# Patient Record
Sex: FEMALE | Employment: PART TIME | URBAN - METROPOLITAN AREA
[De-identification: names, ages, dates, MRNs, and addresses within clinical notes are randomized per-mention and may not be internally consistent; named-entity substitution may affect disease eponyms.]

---

## 2018-11-02 ENCOUNTER — HOSPITAL ENCOUNTER (EMERGENCY)
Age: 23
Discharge: HOME | End: 2018-11-02
Attending: EMERGENCY MEDICINE
Payer: COMMERCIAL

## 2018-11-02 ENCOUNTER — APPOINTMENT (OUTPATIENT)
Dept: CT IMAGING | Age: 23
End: 2018-11-02
Payer: COMMERCIAL

## 2018-11-02 ENCOUNTER — APPOINTMENT (OUTPATIENT)
Dept: GENERAL RADIOLOGY | Age: 23
End: 2018-11-02
Payer: COMMERCIAL

## 2018-11-02 VITALS
HEART RATE: 95 BPM | BODY MASS INDEX: 22.82 KG/M2 | SYSTOLIC BLOOD PRESSURE: 123 MMHG | DIASTOLIC BLOOD PRESSURE: 83 MMHG | OXYGEN SATURATION: 100 % | WEIGHT: 124 LBS | HEIGHT: 62 IN | TEMPERATURE: 98.6 F | RESPIRATION RATE: 16 BRPM

## 2018-11-02 DIAGNOSIS — M50.30 DEGENERATIVE DISC DISEASE, CERVICAL: ICD-10-CM

## 2018-11-02 DIAGNOSIS — R20.2 PARESTHESIAS: Primary | ICD-10-CM

## 2018-11-02 LAB
ABSOLUTE EOS #: 0.1 K/UL (ref 0–0.4)
ABSOLUTE IMMATURE GRANULOCYTE: ABNORMAL K/UL (ref 0–0.3)
ABSOLUTE LYMPH #: 1.3 K/UL (ref 1–4.8)
ABSOLUTE MONO #: 0.4 K/UL (ref 0.2–0.8)
ACETAMINOPHEN LEVEL: <10 UG/ML (ref 10–30)
AMPHETAMINE SCREEN URINE: NEGATIVE
ANION GAP SERPL CALCULATED.3IONS-SCNC: 13 MMOL/L (ref 9–17)
BARBITURATE SCREEN URINE: NEGATIVE
BASOPHILS # BLD: 0 % (ref 0–2)
BASOPHILS ABSOLUTE: 0 K/UL (ref 0–0.2)
BENZODIAZEPINE SCREEN, URINE: NEGATIVE
BILIRUBIN URINE: NEGATIVE
BUN BLDV-MCNC: 10 MG/DL (ref 6–20)
BUN/CREAT BLD: 13 (ref 9–20)
BUPRENORPHINE URINE: NORMAL
CALCIUM SERPL-MCNC: 9.5 MG/DL (ref 8.6–10.4)
CANNABINOID SCREEN URINE: NEGATIVE
CHLORIDE BLD-SCNC: 105 MMOL/L (ref 98–107)
CHP ED QC CHECK: NORMAL
CHP ED QC CHECK: NORMAL
CO2: 24 MMOL/L (ref 20–31)
COCAINE METABOLITE, URINE: NEGATIVE
COLOR: YELLOW
COMMENT UA: NORMAL
CREAT SERPL-MCNC: 0.76 MG/DL (ref 0.5–0.9)
DIFFERENTIAL TYPE: ABNORMAL
EKG ATRIAL RATE: 95 BPM
EKG P AXIS: 72 DEGREES
EKG P-R INTERVAL: 146 MS
EKG Q-T INTERVAL: 348 MS
EKG QRS DURATION: 72 MS
EKG QTC CALCULATION (BAZETT): 437 MS
EKG R AXIS: 56 DEGREES
EKG T AXIS: 53 DEGREES
EKG VENTRICULAR RATE: 95 BPM
EOSINOPHILS RELATIVE PERCENT: 1 % (ref 1–4)
ETHANOL PERCENT: 0.01 %
ETHANOL: 10 MG/DL
GFR AFRICAN AMERICAN: >60 ML/MIN
GFR NON-AFRICAN AMERICAN: >60 ML/MIN
GFR SERPL CREATININE-BSD FRML MDRD: ABNORMAL ML/MIN/{1.73_M2}
GFR SERPL CREATININE-BSD FRML MDRD: ABNORMAL ML/MIN/{1.73_M2}
GLUCOSE BLD-MCNC: 100 MG/DL
GLUCOSE BLD-MCNC: 100 MG/DL (ref 65–105)
GLUCOSE BLD-MCNC: 100 MG/DL (ref 70–99)
GLUCOSE URINE: NEGATIVE
HCT VFR BLD CALC: 40.3 % (ref 36–46)
HEMOGLOBIN: 14.1 G/DL (ref 12–16)
IMMATURE GRANULOCYTES: ABNORMAL %
KETONES, URINE: NEGATIVE
LEUKOCYTE ESTERASE, URINE: NEGATIVE
LYMPHOCYTES # BLD: 24 % (ref 24–44)
MCH RBC QN AUTO: 31.2 PG (ref 26–34)
MCHC RBC AUTO-ENTMCNC: 35 G/DL (ref 31–37)
MCV RBC AUTO: 89.3 FL (ref 80–100)
MDMA URINE: NORMAL
METHADONE SCREEN, URINE: NEGATIVE
METHAMPHETAMINE, URINE: NORMAL
MONOCYTES # BLD: 7 % (ref 1–7)
NITRITE, URINE: NEGATIVE
NRBC AUTOMATED: ABNORMAL PER 100 WBC
OPIATES, URINE: NEGATIVE
OXYCODONE SCREEN URINE: NEGATIVE
PDW BLD-RTO: 12.3 % (ref 11.5–14.5)
PH UA: 7.5 (ref 5–8)
PHENCYCLIDINE, URINE: NEGATIVE
PLATELET # BLD: 238 K/UL (ref 130–400)
PLATELET ESTIMATE: ABNORMAL
PMV BLD AUTO: 7.8 FL (ref 6–12)
POTASSIUM SERPL-SCNC: 3.6 MMOL/L (ref 3.7–5.3)
PREGNANCY TEST URINE, POC: NORMAL
PROPOXYPHENE, URINE: NORMAL
PROTEIN UA: NEGATIVE
RBC # BLD: 4.51 M/UL (ref 4–5.2)
RBC # BLD: ABNORMAL 10*6/UL
SALICYLATE LEVEL: <1 MG/DL (ref 3–10)
SEG NEUTROPHILS: 68 % (ref 36–66)
SEGMENTED NEUTROPHILS ABSOLUTE COUNT: 3.6 K/UL (ref 1.8–7.7)
SODIUM BLD-SCNC: 142 MMOL/L (ref 135–144)
SPECIFIC GRAVITY UA: 1.01 (ref 1–1.03)
TEST INFORMATION: NORMAL
TOXIC TRICYCLIC SC,BLOOD: NEGATIVE
TRICYCLIC ANTIDEPRESSANTS, UR: NORMAL
TURBIDITY: CLEAR
URINE HGB: NEGATIVE
UROBILINOGEN, URINE: NORMAL
WBC # BLD: 5.4 K/UL (ref 3.5–11)
WBC # BLD: ABNORMAL 10*3/UL

## 2018-11-02 PROCEDURE — 96360 HYDRATION IV INFUSION INIT: CPT

## 2018-11-02 PROCEDURE — 85025 COMPLETE CBC W/AUTO DIFF WBC: CPT

## 2018-11-02 PROCEDURE — 2580000003 HC RX 258: Performed by: NURSE PRACTITIONER

## 2018-11-02 PROCEDURE — 70450 CT HEAD/BRAIN W/O DYE: CPT

## 2018-11-02 PROCEDURE — 80048 BASIC METABOLIC PNL TOTAL CA: CPT

## 2018-11-02 PROCEDURE — 72040 X-RAY EXAM NECK SPINE 2-3 VW: CPT

## 2018-11-02 PROCEDURE — 93005 ELECTROCARDIOGRAM TRACING: CPT

## 2018-11-02 PROCEDURE — 80307 DRUG TEST PRSMV CHEM ANLYZR: CPT

## 2018-11-02 PROCEDURE — 99285 EMERGENCY DEPT VISIT HI MDM: CPT

## 2018-11-02 PROCEDURE — G0480 DRUG TEST DEF 1-7 CLASSES: HCPCS

## 2018-11-02 PROCEDURE — 84703 CHORIONIC GONADOTROPIN ASSAY: CPT

## 2018-11-02 PROCEDURE — 81003 URINALYSIS AUTO W/O SCOPE: CPT

## 2018-11-02 PROCEDURE — 82947 ASSAY GLUCOSE BLOOD QUANT: CPT

## 2018-11-02 RX ORDER — BUPROPION HYDROCHLORIDE 300 MG/1
450 TABLET ORAL EVERY MORNING
COMMUNITY
End: 2021-06-24

## 2018-11-02 RX ORDER — 0.9 % SODIUM CHLORIDE 0.9 %
1000 INTRAVENOUS SOLUTION INTRAVENOUS ONCE
Status: COMPLETED | OUTPATIENT
Start: 2018-11-02 | End: 2018-11-02

## 2018-11-02 RX ADMIN — SODIUM CHLORIDE 1000 ML: 9 INJECTION, SOLUTION INTRAVENOUS at 16:47

## 2018-11-02 ASSESSMENT — ENCOUNTER SYMPTOMS
EYE PAIN: 0
TROUBLE SWALLOWING: 0
VOICE CHANGE: 0
ABDOMINAL PAIN: 0
SHORTNESS OF BREATH: 0
PHOTOPHOBIA: 0
VOMITING: 0
COLOR CHANGE: 0
COUGH: 0
SORE THROAT: 0
DIARRHEA: 0
NAUSEA: 0
BACK PAIN: 0

## 2018-11-02 NOTE — ED PROVIDER NOTES
subscore is 6. Skin: Skin is warm and dry. No rash noted. She is not diaphoretic. Psychiatric: She has a normal mood and affect. Her behavior is normal.   Vitals reviewed. DIAGNOSTIC RESULTS     RADIOLOGY:   Non-plain film images such as CT, Ultrasound and MRI are read by the radiologist. Plain radiographic images are visualized and preliminarily interpreted by the emergency physician with the below findings:    Interpretation per the Radiologist below, if available at the time of this note:    Xr Cervical Spine (2-3 Views)    Result Date: 11/2/2018  EXAMINATION: 3 XRAY VIEWS OF THE CERVICAL SPINE 11/2/2018 4:36 pm COMPARISON: None. HISTORY: ORDERING SYSTEM PROVIDED HISTORY: N/T right arm TECHNOLOGIST PROVIDED HISTORY: N/T right arm Ordering Physician Provided Reason for Exam: bilateral numbness in hands and tingle Acuity: Unknown Type of Exam: Unknown Additional signs and symptoms: n/a Relevant Medical/Surgical History: n/a FINDINGS: Mineralization and alignment are satisfactory. No acute fracture, subluxation, or prevertebral soft tissue swelling is noted. The odontoid is intact. Unremarkable cervical spine radiographic appearance. Ct Head Wo Contrast    Result Date: 11/2/2018  EXAMINATION: CT OF THE HEAD WITHOUT CONTRAST  11/2/2018 4:19 pm TECHNIQUE: CT of the head was performed without the administration of intravenous contrast. Dose modulation, iterative reconstruction, and/or weight based adjustment of the mA/kV was utilized to reduce the radiation dose to as low as reasonably achievable. COMPARISON: None. HISTORY: ORDERING SYSTEM PROVIDED HISTORY: N/T to face, right upper/lower extremity, difficulty speaking TECHNOLOGIST PROVIDED HISTORY: FINDINGS: BRAIN/VENTRICLES: There is no acute intracranial hemorrhage, mass effect or midline shift. No abnormal extra-axial fluid collection. The gray-white differentiation is maintained without evidence of an acute infarct.   There is no evidence of

## 2018-11-05 LAB — HCG, PREGNANCY URINE (POC): NEGATIVE

## 2021-06-24 ENCOUNTER — OFFICE VISIT (OUTPATIENT)
Dept: FAMILY MEDICINE CLINIC | Age: 26
End: 2021-06-24
Payer: COMMERCIAL

## 2021-06-24 VITALS
WEIGHT: 134 LBS | TEMPERATURE: 97.2 F | DIASTOLIC BLOOD PRESSURE: 81 MMHG | HEART RATE: 78 BPM | BODY MASS INDEX: 24.66 KG/M2 | OXYGEN SATURATION: 97 % | HEIGHT: 62 IN | SYSTOLIC BLOOD PRESSURE: 117 MMHG

## 2021-06-24 DIAGNOSIS — S23.9XXA THORACIC BACK SPRAIN, INITIAL ENCOUNTER: ICD-10-CM

## 2021-06-24 DIAGNOSIS — J30.2 SEASONAL ALLERGIES: ICD-10-CM

## 2021-06-24 DIAGNOSIS — Z11.59 NEED FOR HEPATITIS C SCREENING TEST: ICD-10-CM

## 2021-06-24 DIAGNOSIS — Z00.00 ANNUAL PHYSICAL EXAM: Primary | ICD-10-CM

## 2021-06-24 PROBLEM — M54.2 NECK PAIN: Status: ACTIVE | Noted: 2021-06-24

## 2021-06-24 PROCEDURE — 99385 PREV VISIT NEW AGE 18-39: CPT | Performed by: FAMILY MEDICINE

## 2021-06-24 RX ORDER — FLUTICASONE PROPIONATE 50 MCG
SPRAY, SUSPENSION (ML) NASAL
COMMUNITY
End: 2021-12-29 | Stop reason: ALTCHOICE

## 2021-06-24 RX ORDER — FOLIC ACID 1 MG/1
1 TABLET ORAL DAILY
COMMUNITY
End: 2021-12-29 | Stop reason: ALTCHOICE

## 2021-06-24 SDOH — ECONOMIC STABILITY: FOOD INSECURITY: WITHIN THE PAST 12 MONTHS, YOU WORRIED THAT YOUR FOOD WOULD RUN OUT BEFORE YOU GOT MONEY TO BUY MORE.: NEVER TRUE

## 2021-06-24 SDOH — ECONOMIC STABILITY: FOOD INSECURITY: WITHIN THE PAST 12 MONTHS, THE FOOD YOU BOUGHT JUST DIDN'T LAST AND YOU DIDN'T HAVE MONEY TO GET MORE.: NEVER TRUE

## 2021-06-24 ASSESSMENT — PATIENT HEALTH QUESTIONNAIRE - PHQ9
SUM OF ALL RESPONSES TO PHQ QUESTIONS 1-9: 0
SUM OF ALL RESPONSES TO PHQ QUESTIONS 1-9: 0
SUM OF ALL RESPONSES TO PHQ9 QUESTIONS 1 & 2: 0
2. FEELING DOWN, DEPRESSED OR HOPELESS: 0
SUM OF ALL RESPONSES TO PHQ QUESTIONS 1-9: 0
1. LITTLE INTEREST OR PLEASURE IN DOING THINGS: 0

## 2021-06-24 ASSESSMENT — ENCOUNTER SYMPTOMS
ABDOMINAL DISTENTION: 1
DIARRHEA: 1
RESPIRATORY NEGATIVE: 1
BACK PAIN: 1
EYES NEGATIVE: 1
CONSTIPATION: 1

## 2021-06-24 ASSESSMENT — SOCIAL DETERMINANTS OF HEALTH (SDOH): HOW HARD IS IT FOR YOU TO PAY FOR THE VERY BASICS LIKE FOOD, HOUSING, MEDICAL CARE, AND HEATING?: NOT HARD AT ALL

## 2021-06-24 NOTE — PATIENT INSTRUCTIONS
Patient Education        Back Stretches: Exercises  Introduction  Here are some examples of exercises for stretching your back. Start each exercise slowly. Ease off the exercise if you start to have pain. Your doctor or physical therapist will tell you when you can start these exercises and which ones will work best for you. How to do the exercises  Overhead stretch   1. Stand comfortably with your feet shoulder-width apart. 2. Looking straight ahead, raise both arms over your head and reach toward the ceiling. Do not allow your head to tilt back. 3. Hold for 15 to 30 seconds, then lower your arms to your sides. 4. Repeat 2 to 4 times. Side stretch   1. Stand comfortably with your feet shoulder-width apart. 2. Raise one arm over your head, and then lean to the other side. 3. Slide your hand down your leg as you let the weight of your arm gently stretch your side muscles. Hold for 15 to 30 seconds. 4. Repeat 2 to 4 times on each side. Press-up   1. Lie on your stomach, supporting your body with your forearms. 2. Press your elbows down into the floor to raise your upper back. As you do this, relax your stomach muscles and allow your back to arch without using your back muscles. As your press up, do not let your hips or pelvis come off the floor. 3. Hold for 15 to 30 seconds, then relax. 4. Repeat 2 to 4 times. Relax and rest   1. Lie on your back with a rolled towel under your neck and a pillow under your knees. Extend your arms comfortably to your sides. 2. Relax and breathe normally. 3. Remain in this position for about 10 minutes. 4. If you can, do this 2 or 3 times each day. Follow-up care is a key part of your treatment and safety. Be sure to make and go to all appointments, and call your doctor if you are having problems. It's also a good idea to know your test results and keep a list of the medicines you take. Where can you learn more? Go to https://alex.healthImmunetrics. org and sign in to your [employer] account. Enter B427 in the D[employer] SL box to learn more about \"Back Stretches: Exercises. \"     If you do not have an account, please click on the \"Sign Up Now\" link. Current as of: November 16, 2020               Content Version: 12.9  © 2006-2021 Healthwise, [employer]. Care instructions adapted under license by [facility] ([facility]). If you have questions about a medical condition or this instruction, always ask your healthcare professional. [address] any warranty or liability for your use of this information. Patient Education        Healthy Upper Back: Exercises  Introduction  Here are some examples of exercises for your upper back. Start each exercise slowly. Ease off the exercise if you start to have pain. Your doctor or physical therapist will tell you when you can start these exercises and which ones will work best for you. How to do the exercises  Lower neck and upper back stretch   1. Stretch your arms out in front of your body. Clasp one hand on top of your other hand. 2. Gently reach out so that you feel your shoulder blades stretching away from each other. 3. Gently bend your head forward. 4. Hold for 15 to 30 seconds. 5. Repeat 2 to 4 times. Midback stretch   If you have knee pain, do not do this exercise. 1. Kneel on the floor, and sit back on your ankles. 2. Lean forward, place your hands on the floor, and stretch your arms out in front of you. Rest your head between your arms. 3. Gently push your chest toward the floor, reaching as far in front of you as possible. 4. Hold for 15 to 30 seconds. 5. Repeat 2 to 4 times. Shoulder rolls   1. Sit comfortably with your feet shoulder-width apart. You can also do this exercise while standing. 2. Roll your shoulders up, then back, and then down in a smooth, circular motion. 3. Repeat 2 to 4 times. Wall push-up   1.  Stand against a wall with your feet about 12 to 24 inches back from the wall. If you feel any pain when you do this exercise, stand closer to the wall. 2. Place your hands on the wall slightly wider apart than your shoulders, and lean forward. 3. Gently lean your body toward the wall. Then push back to your starting position. Keep the motion smooth and controlled. 4. Repeat 8 to 12 times. Resisted shoulder blade squeeze   For this exercise, you will need elastic exercise material, such as surgical tubing or Thera-Band. 1. Sit or stand, holding the band in both hands in front of you. Keep your elbows close to your sides, bent at a 90-degree angle. Your palms should face up. 2. Squeeze your shoulder blades together, and move your arms to the outside, stretching the band. Be sure to keep your elbows at your sides while you do this. 3. Relax. 4. Repeat 8 to 12 times. Resisted rows   For this exercise, you will need elastic exercise material, such as surgical tubing or Thera-Band. 1. Put the band around a solid object, such as a bedpost, at about waist level. Hold one end of the band in each hand. 2. With your elbows at your sides and bent to 90 degrees, pull the band back to move your shoulder blades toward each other. Return to the starting position. 3. Repeat 8 to 12 times. Follow-up care is a key part of your treatment and safety. Be sure to make and go to all appointments, and call your doctor if you are having problems. It's also a good idea to know your test results and keep a list of the medicines you take. Where can you learn more? Go to https://LivemapowenProtean Payment.TribeHired. org and sign in to your [employer] account. Enter Y655 in the [employer] box to learn more about \"Healthy Upper Back: Exercises. \"     If you do not have an account, please click on the \"Sign Up Now\" link. Current as of: November 16, 2020               Content Version: 12.9  © 7091-6648 Healthwise, Incorporated.    Care instructions adapted under license by [facility] ([facility]). If you have questions about a medical condition or this instruction, always ask your healthcare professional. [place] [address] any warranty or liability for your use of this information.

## 2021-06-24 NOTE — PROGRESS NOTES
[address] PRIMARY CARE  [address] [place] [address] [address] [place] [address]  Dept: [phone]  Dept Fax: [phone]    Griselda Sole is a 22 y.o. female who is a New patient, who presents today for her medical conditions/complaints as noted below:  Chief Complaint   Patient presents with    Establish Care    Back Pain     pulled muscle         HPI:     The patient is a 22year old female with no significant past medical history who presents today as a new patient. The patient's past medical history, surgical history, family history, social history were all updated and reviewed in the chart. The patient complains of mid to low back pain. She did get a new mattress about 2 weeks ago but the pain in the back began about 2 days ago. The pain is worse with twisting motion or after rest.  It feels like a tension/pulling. She has tried nothing to help with the symptoms. Back Pain  This is a new problem. The current episode started in the past 7 days. The problem occurs daily. The problem is unchanged. The pain is present in the thoracic spine and lumbar spine. The quality of the pain is described as aching. The pain does not radiate. The pain is mild. The pain is the same all the time. The symptoms are aggravated by twisting. Pertinent negatives include no abdominal pain, bladder incontinence, bowel incontinence, chest pain, dysuria, fever, headaches, leg pain, numbness, paresis, paresthesias, pelvic pain, perianal numbness, tingling, weakness or weight loss. She has tried nothing for the symptoms. The treatment provided no relief. No results found for: LABA1C          ( goal A1Cis < 7)   No results found for: LABMICR  No results found for: LDLCHOLESTEROL, LDLCALC    (goal LDL is <100)   BUN (mg/dL)   Date Value   11/02/2018 10     BP Readings from Last 3 Encounters:   06/24/21 117/81   11/02/18 123/83          (goal 120/80)    History reviewed.  No pertinent past medical history. Past Surgical History:   Procedure Laterality Date    HYMENECTOMY  2014       Family History   Problem Relation Age of Onset    Colon Cancer Mother 36    Hypertension Father     Alzheimer's Disease Maternal Grandmother     Macular Degen Maternal Grandfather     Coronary Art Dis Maternal Grandfather     Kidney Disease Maternal Grandfather     Breast Cancer Paternal Grandmother     Alzheimer's Disease Paternal Grandfather     Colon Cancer Maternal Aunt     Stroke Paternal Aunt     Ovarian Cancer Neg Hx     Uterine Cancer Neg Hx        Social History     Tobacco Use    Smoking status: Never Smoker    Smokeless tobacco: Never Used   Substance Use Topics    Alcohol use: Yes     Comment: socially 2x /mth      Current Outpatient Medications   Medication Sig Dispense Refill    fluticasone (FLONASE) 50 MCG/ACT nasal spray fluticasone propionate 50 mcg/actuation nasal spray,suspension      Docosahexaenoic Acid (PRENATAL DHA PO) Take by mouth      Cholecalciferol 100 MCG (4000 UT) CAPS Take by mouth      folic acid (FOLVITE) 1 MG tablet Take 1 mg by mouth daily       No current facility-administered medications for this visit.      Allergies   Allergen Reactions    Sulfa Antibiotics Itching and Other (See Comments)     Burning internally    Sulfamethoxazole-Trimethoprim        Health Maintenance   Topic Date Due    Hepatitis C screen  Never done    Varicella vaccine (1 of 2 - 2-dose childhood series) Never done    COVID-19 Vaccine (1) Never done    HIV screen  Never done    Cervical cancer screen  Never done    Annual Wellness Visit (AWV)  Never done    HPV vaccine (1 - 2-dose series) 06/24/2022 (Originally 12/9/2006)    Flu vaccine (Season Ended) 09/01/2021    DTaP/Tdap/Td vaccine (2 - Td or Tdap) 01/27/2027    Hepatitis A vaccine  Aged Out    Hepatitis B vaccine  Aged Out    Hib vaccine  Aged Out    Meningococcal (ACWY) vaccine  Aged Out    Pneumococcal 0-64 years Vaccine  Aged Out       Subjective:     Review of Systems   Constitutional: Negative. Negative for fever and weight loss. HENT: Negative. Eyes: Negative. Respiratory: Negative. Cardiovascular: Negative. Negative for chest pain. Gastrointestinal: Positive for abdominal distention, constipation and diarrhea. Negative for abdominal pain and bowel incontinence. Bowel movements are irregular and inconsistent   Genitourinary: Negative. Negative for bladder incontinence, dysuria and pelvic pain. Musculoskeletal: Positive for back pain. Skin: Negative. Allergic/Immunologic: Negative for environmental allergies, food allergies and immunocompromised state. Neurological: Negative. Negative for tingling, weakness, numbness, headaches and paresthesias. Psychiatric/Behavioral: Negative. Objective:     Physical Exam  Vitals and nursing note reviewed. Constitutional:       General: She is awake. She is not in acute distress. Appearance: Normal appearance. She is normal weight. She is not ill-appearing, toxic-appearing or diaphoretic. HENT:      Head: Normocephalic and atraumatic. Right Ear: Tympanic membrane, ear canal and external ear normal.      Left Ear: Tympanic membrane, ear canal and external ear normal.      Nose: Nose normal.      Mouth/Throat:      Mouth: Mucous membranes are moist.   Eyes:      Extraocular Movements: Extraocular movements intact. Conjunctiva/sclera: Conjunctivae normal.      Pupils: Pupils are equal, round, and reactive to light. Cardiovascular:      Rate and Rhythm: Normal rate and regular rhythm. Pulses: Normal pulses. Heart sounds: Normal heart sounds. No murmur heard. No friction rub. No gallop. Pulmonary:      Effort: Pulmonary effort is normal. No respiratory distress. Breath sounds: Normal breath sounds. No stridor. No wheezing, rhonchi or rales. Abdominal:      General: Abdomen is flat.  Bowel sounds are normal.      Palpations: Abdomen is soft. Musculoskeletal:         General: Normal range of motion. Cervical back: Normal, normal range of motion and neck supple. Thoracic back: Spasms present. Lumbar back: Spasms present. Comments: Spasm/pain with twisting motion. Skin:     Capillary Refill: Capillary refill takes less than 2 seconds. Neurological:      General: No focal deficit present. Mental Status: She is alert and oriented to person, place, and time. Mental status is at baseline. Cranial Nerves: No cranial nerve deficit. Sensory: No sensory deficit. Motor: No weakness. Coordination: Coordination normal.      Gait: Gait normal.   Psychiatric:         Attention and Perception: Attention normal.         Mood and Affect: Mood and affect normal.         Speech: Speech normal.         Behavior: Behavior normal.         Thought Content: Thought content normal.         Judgment: Judgment normal.       /81   Pulse 78   Temp 97.2 °F (36.2 °C)   Ht 5' 2\" (1.575 m)   Wt 134 lb (60.8 kg)   SpO2 97%   BMI 24.51 kg/m²     Assessment:       Diagnosis Orders   1. Annual physical exam     2. Thoracic back sprain, initial encounter     3. Need for hepatitis C screening test  Hepatitis C Antibody             Plan:    Annual physical exam-discussed with patient importance of exercise. Advised to 150 minutes of exercise per week as well as a healthy diet that consists of fruit, vegetables and healthy meats. Discussed with her the importance of wearing a seatbelt, wearing sunscreen, seeing the dentist every 6 months and wearing a helmet when on bicycles or motorcycles. Patient states she has no depression or anxiety. She states she has a good support system as well. Thoracic back sprain-patient encouraged to use Tylenol/ibuprofen. Use a seat in the area as well. Discuss stretches to do.   Exercise provided for patient  Return in about 1 year (around 6/24/2022), or physical, for physical.    Orders Placed This Encounter   Procedures    Hepatitis C Antibody     Standing Status:   Future     Standing Expiration Date:   6/24/2022   Piyush Davila MD, Gynecology, [place]     Referral Priority:   Routine     Referral Type:   Eval and Treat     Referral Reason:   Specialty Services Required     Referred to Provider:   Harper Rehman MD     Requested Specialty:   Obstetrics & Gynecology     Number of Visits Requested:   1     No orders of the defined types were placed in this encounter. Patient given educational materials - see patient instructions. Discussed use, benefit, and side effects of prescribed medications. All patientquestions answered. Pt voiced understanding. Reviewed health maintenance. Instructedto continue current medications, diet and exercise. Patient agreed with treatmentplan. Follow up as directed.      Electronically signed by Esther Sánchez MD on 6/28/2021 at 7:23 AM

## 2021-06-28 ASSESSMENT — ENCOUNTER SYMPTOMS
ABDOMINAL PAIN: 0
BOWEL INCONTINENCE: 0

## 2021-08-10 ENCOUNTER — HOSPITAL ENCOUNTER (OUTPATIENT)
Age: 26
Setting detail: SPECIMEN
Discharge: HOME | End: 2021-08-10
Payer: COMMERCIAL

## 2021-08-10 ENCOUNTER — OFFICE VISIT (OUTPATIENT)
Dept: OBGYN CLINIC | Age: 26
End: 2021-08-10
Payer: COMMERCIAL

## 2021-08-10 VITALS
TEMPERATURE: 98.6 F | HEART RATE: 70 BPM | BODY MASS INDEX: 25.31 KG/M2 | WEIGHT: 138.4 LBS | SYSTOLIC BLOOD PRESSURE: 119 MMHG | DIASTOLIC BLOOD PRESSURE: 84 MMHG

## 2021-08-10 DIAGNOSIS — Z01.419 WOMEN'S ANNUAL ROUTINE GYNECOLOGICAL EXAMINATION: Primary | ICD-10-CM

## 2021-08-10 PROCEDURE — 99385 PREV VISIT NEW AGE 18-39: CPT | Performed by: OBSTETRICS & GYNECOLOGY

## 2021-08-10 ASSESSMENT — ENCOUNTER SYMPTOMS
ABDOMINAL PAIN: 0
COUGH: 0
SHORTNESS OF BREATH: 0
BACK PAIN: 0
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
## 2021-08-20 LAB — CYTOLOGY REPORT: NORMAL

## 2021-08-24 PROBLEM — R87.610 ASCUS WITH POSITIVE HIGH RISK HPV CERVICAL: Status: ACTIVE | Noted: 2021-08-24

## 2021-08-24 PROBLEM — R87.810 ASCUS WITH POSITIVE HIGH RISK HPV CERVICAL: Status: ACTIVE | Noted: 2021-08-24

## 2021-08-24 LAB
HPV SAMPLE: ABNORMAL
HPV, GENOTYPE 16: NOT DETECTED
HPV, GENOTYPE 18: NOT DETECTED
HPV, HIGH RISK OTHER: DETECTED
HPV, INTERPRETATION: ABNORMAL
SPECIMEN DESCRIPTION: ABNORMAL

## 2021-10-25 NOTE — PROGRESS NOTES
[facility] PHYSICIANS  [facility] - [address] [facility] [address]  Dept: [phone]     COLPOSCOPY PROCEDURE FORM    Chief Complaint:   Chief Complaint   Patient presents with    Procedure     [place]          10/26/2021  Robert Flow  Patient's last menstrual period was 09/22/2021.  22 y.o.  Mario Carpio    History reviewed. No pertinent past medical history.       Past Surgical History:   Procedure Laterality Date    HYMENECTOMY  2014       Family History   Problem Relation Age of Onset    Colon Cancer Mother 36    Hypertension Father     Alzheimer's Disease Maternal Grandmother     Macular Degen Maternal Grandfather     Coronary Art Dis Maternal Grandfather     Kidney Disease Maternal Grandfather     Breast Cancer Paternal Grandmother     Alzheimer's Disease Paternal Grandfather     Colon Cancer Maternal Aunt     Stroke Paternal Aunt     Ovarian Cancer Neg Hx     Uterine Cancer Neg Hx        Social History     Socioeconomic History    Marital status: Single     Spouse name: Not on file    Number of children: Not on file    Years of education: Not on file    Highest education level: Not on file   Occupational History    Occupation: RN     Employer: [employer]   Tobacco Use    Smoking status: Never Smoker    Smokeless tobacco: Never Used   Vaping Use    Vaping Use: Never used   Substance and Sexual Activity    Alcohol use: Not Currently     Comment: socially 2x /mth    Drug use: Never    Sexual activity: Yes     Partners: Female     Comment: has been with her partner since 2019   Other Topics Concern    Not on file   Social History Narrative    Not on file     Social Determinants of Health     Financial Resource Strain: Low Risk     Difficulty of Paying Living Expenses: Not hard at all   Food Insecurity: No Food Insecurity    Worried About [address] in the Last Year: Never true    [address] [facility] in the Last Year: Never true Transportation Needs:     Lack of Transportation (Medical):  Lack of Transportation (Non-Medical):    Physical Activity:     Days of Exercise per Week:     Minutes of Exercise per Session:    Stress:     Feeling of Stress :    Social Connections:     Frequency of Communication with Friends and Family:     Frequency of Social Gatherings with Friends and Family:     Attends Episcopal Services:     Active Member of Clubs or Organizations:     Attends Club or Organization Meetings:     Marital Status:    Intimate Partner Violence:     Fear of Current or Ex-Partner:     Emotionally Abused:     Physically Abused:     Sexually Abused:        Current Outpatient Medications on File Prior to Visit   Medication Sig Dispense Refill    sertraline (ZOLOFT) 50 MG tablet Take 1 tablet by mouth daily 30 tablet 12    Docosahexaenoic Acid (PRENATAL DHA PO) Take by mouth      fluticasone (FLONASE) 50 MCG/ACT nasal spray fluticasone propionate 50 mcg/actuation nasal spray,suspension (Patient not taking: Reported on 8/10/2021)      Cholecalciferol 100 MCG (4000 UT) CAPS Take by mouth (Patient not taking: Reported on 2/24/1982)      folic acid (FOLVITE) 1 MG tablet Take 1 mg by mouth daily       No current facility-administered medications on file prior to visit. Allergies as of 10/26/2021 - Fully Reviewed 10/26/2021   Allergen Reaction Noted    Sulfa antibiotics Itching and Other (See Comments) 11/02/2018    Sulfamethoxazole-trimethoprim  06/24/2021           INDICATIONS:   1. ASCUS with positive high risk HPV cervical                   UHCG: positive         HPV:   positive      Birth Control Method: none  Abnormal Cytology and Colposcopy History: ASCUS 2021, no prior abnormals    COLPOSCOPIC EXAMINATION:                Blood pressure 118/81, pulse 73, height 5' 2\" (1.575 m), weight 137 lb (62.1 kg), last menstrual period 09/22/2021.   Gross observations: negative  Satisfactory: Yes   Unsatisfactory: No    Physical Exam  Genitourinary:           Comments: Normal appearing cervix. Slight acetowhite changes at 2 o'clock. ECC performed:  No    Lugols Iodine Applied:   No       IMPRESSIONS: normal cervix  Biopsy sites: none      Assessment:   Diagnosis Orders   1. ASCUS with positive high risk HPV cervical  Colposcopy    Surgical Pathology         PLAN:   1. The patient was given formal restriction guidelines. She is instructed to report  any increase in vaginal bleeding, discharge, or any temperature more than 100.4   F. Strict pelvic rest precautions were reviewed with lifting restrictions. Will call pt with results of colposcopy.          Blane Mathias MD

## 2021-10-26 ENCOUNTER — PROCEDURE VISIT (OUTPATIENT)
Dept: OBGYN CLINIC | Age: 26
End: 2021-10-26

## 2021-10-26 VITALS
DIASTOLIC BLOOD PRESSURE: 81 MMHG | WEIGHT: 137 LBS | HEART RATE: 73 BPM | BODY MASS INDEX: 25.21 KG/M2 | SYSTOLIC BLOOD PRESSURE: 118 MMHG | HEIGHT: 62 IN

## 2021-10-26 DIAGNOSIS — R87.810 ASCUS WITH POSITIVE HIGH RISK HPV CERVICAL: Primary | ICD-10-CM

## 2021-10-26 DIAGNOSIS — R87.610 ASCUS WITH POSITIVE HIGH RISK HPV CERVICAL: Primary | ICD-10-CM

## 2021-11-29 NOTE — PROGRESS NOTES
[facility]YS[facility]  [facility] - [address] [facility]  Dept: [phone]  21    Chief Complaint   Patient presents with    Amenorrhea     lmp  21 9w6d by lmp IUI 10/6/21 Raleigh Hyde has pt at 10w0d today       Mena Lopez is a  who presents for initial confirmation of pregnancy. She and her wife Phan Cedeno are very excited for the pregnancy. She had an IUI on 10/6/21 with VIRIDIANA. She is having some nausea. She says it improves when she eats something. She denies any history of CHTN, DM, asthma. She did have chicken pox in the past.      Planned/unplanned:  Planned  DIYA:  Estimated Date of Delivery:  [id number]  COMPLICATIONS CONCERNS: IUI  PRENATAL HISTORY:  First pregnancy    Blood pressure 113/75, pulse 72, height 5' 2\" (1.575 m), weight 141 lb (64 kg), last menstrual period 2021. History reviewed. No pertinent past medical history.   Past Surgical History:   Procedure Laterality Date    HYMENECTOMY       Social History     Socioeconomic History    Marital status: Single     Spouse name: Not on file    Number of children: Not on file    Years of education: Not on file    Highest education level: Not on file   Occupational History    Occupation: RN     Employer: [employer]   Tobacco Use    Smoking status: Never Smoker    Smokeless tobacco: Never Used   Vaping Use    Vaping Use: Never used   Substance and Sexual Activity    Alcohol use: Not Currently     Comment: socially 2x /mth    Drug use: Never    Sexual activity: Yes     Partners: Female     Comment: has been with her partner since 2019   Other Topics Concern    Not on file   Social History Narrative    Not on file     Social Determinants of Health     Financial Resource Strain: Low Risk     Difficulty of Paying Living Expenses: Not hard at all   Food Insecurity: No Food Insecurity    Worried About [address] in the Last Year: Never true    [address] in the Last Year: Never true   Transportation Needs:     Lack of Transportation (Medical): Not on file    Lack of Transportation (Non-Medical):  Not on file   Physical Activity:     Days of Exercise per Week: Not on file    Minutes of Exercise per Session: Not on file   Stress:     Feeling of Stress : Not on file   Social Connections:     Frequency of Communication with Friends and Family: Not on file    Frequency of Social Gatherings with Friends and Family: Not on file    Attends Episcopalian Services: Not on file    Active Member of [address] [place] [address] [employer] or Organizations: Not on file    Attends Club or Organization Meetings: Not on file    Marital Status: Not on file   Intimate Partner Violence:     Fear of Current or Ex-Partner: Not on file    Emotionally Abused: Not on file    Physically Abused: Not on file    Sexually Abused: Not on file   Housing Stability:     Unable to Pay for Housing in the Last Year: Not on file    Number of [place] in the Last Year: Not on file    Unstable Housing in the Last Year: Not on file     Allergies   Allergen Reactions    Sulfa Antibiotics Itching and Other (See Comments)     Burning internally    Sulfamethoxazole-Trimethoprim      Family History   Problem Relation Age of Onset    Colon Cancer Mother 36    Hypertension Father     Alzheimer's Disease Maternal Grandmother     Macular Degen Maternal Grandfather     Coronary Art Dis Maternal Grandfather     Kidney Disease Maternal Grandfather     Breast Cancer Paternal Grandmother     Alzheimer's Disease Paternal Grandfather     Colon Cancer Maternal Aunt     Stroke Paternal Aunt     Ovarian Cancer Neg Hx     Uterine Cancer Neg Hx        ROS:  Constitutional:  Denies fever or chills, fatigue  Eyes:  Denies change in visual acuity, blurred vision, itching  HENT:  Denies nasal congestion or sore throat   Respiratory:  Denies cough or shortness of breath, difficulty breathing  Cardiovascular:  Denies chest pain or edema  GI:  Denies abdominal pain, nausea, vomiting, bloody stools or diarrhea   :  Denies dysuria, frequency, urgency  Musculoskeletal:  Denies back pain or joint pain   Integument:  Denies rash, itching, dryness  Neurologic:  Denies headache, focal weakness or sensory changes   Endocrine:  Denies polyuria or polydipsia,    Lymphatic:  Denies swollen glands,   Psychiatric:  Denies depression or anxiety       Physical findings: HEENT - Perrla, Eomi  Neck- Supple, no bruits  Lungs - Clear to auscultation. CV- Regular rate and rythym,   Abdomen - Non tender, non distended, no masses  Extremities - no weakness, no calf pain, no edema, no posterior tibial pain  Pelvis - no bleeding, no discharge, no CMT      Assessment/Plan:  Patient Active Problem List   Diagnosis    Neck pain    Thoracic back sprain    ASCUS with positive high risk HPV cervical    Depression        Diagnosis Orders   1. Amenorrhea  POCT urine pregnancy    Urinalysis    Culture, Urine    Urine Drug Screen    VAGINITIS DNA PROBE    C.trachomatis N.gonorrhoeae DNA    Prenatal Profile 1    HIV Screen   2. Missed menses  POCT urine pregnancy    Urinalysis    Culture, Urine    Urine Drug Screen    VAGINITIS DNA PROBE    C.trachomatis N.gonorrhoeae DNA    Prenatal Profile 1    HIV Screen   3. Depression, unspecified depression type       Considering NIPT testing    Return in about 4 weeks (around 2021) for ob history.     Pamela Fleming MD

## 2021-11-30 ENCOUNTER — HOSPITAL ENCOUNTER (OUTPATIENT)
Age: 26
Setting detail: SPECIMEN
Discharge: HOME | End: 2021-11-30

## 2021-11-30 ENCOUNTER — OFFICE VISIT (OUTPATIENT)
Dept: OBGYN CLINIC | Age: 26
End: 2021-11-30
Payer: COMMERCIAL

## 2021-11-30 VITALS
HEART RATE: 72 BPM | DIASTOLIC BLOOD PRESSURE: 75 MMHG | BODY MASS INDEX: 25.95 KG/M2 | HEIGHT: 62 IN | WEIGHT: 141 LBS | SYSTOLIC BLOOD PRESSURE: 113 MMHG

## 2021-11-30 DIAGNOSIS — N92.6 MISSED MENSES: ICD-10-CM

## 2021-11-30 DIAGNOSIS — N91.2 AMENORRHEA: ICD-10-CM

## 2021-11-30 DIAGNOSIS — N91.2 AMENORRHEA: Primary | ICD-10-CM

## 2021-11-30 DIAGNOSIS — F32.A DEPRESSION, UNSPECIFIED DEPRESSION TYPE: ICD-10-CM

## 2021-11-30 LAB
AMPHETAMINE SCREEN URINE: NEGATIVE
BARBITURATE SCREEN URINE: NEGATIVE
BENZODIAZEPINE SCREEN, URINE: NEGATIVE
BILIRUBIN URINE: NEGATIVE
BUPRENORPHINE URINE: NORMAL
CANNABINOID SCREEN URINE: NEGATIVE
COCAINE METABOLITE, URINE: NEGATIVE
COLOR: YELLOW
COMMENT UA: NORMAL
GLUCOSE URINE: NEGATIVE
KETONES, URINE: NEGATIVE
LEUKOCYTE ESTERASE, URINE: NEGATIVE
MDMA URINE: NORMAL
METHADONE SCREEN, URINE: NEGATIVE
METHAMPHETAMINE, URINE: NORMAL
NITRITE, URINE: NEGATIVE
OPIATES, URINE: NEGATIVE
OXYCODONE SCREEN URINE: NEGATIVE
PH UA: 7.5 (ref 5–8)
PHENCYCLIDINE, URINE: NEGATIVE
PROPOXYPHENE, URINE: NORMAL
PROTEIN UA: NEGATIVE
SPECIFIC GRAVITY UA: 1.02 (ref 1–1.03)
TEST INFORMATION: NORMAL
TRICYCLIC ANTIDEPRESSANTS, UR: NORMAL
TURBIDITY: CLEAR
URINE HGB: NEGATIVE
UROBILINOGEN, URINE: NORMAL

## 2021-11-30 PROCEDURE — 99213 OFFICE O/P EST LOW 20 MIN: CPT | Performed by: OBSTETRICS & GYNECOLOGY

## 2021-12-01 LAB
C TRACH DNA GENITAL QL NAA+PROBE: NEGATIVE
CULTURE: NORMAL
DIRECT EXAM: NORMAL
Lab: NORMAL
Lab: NORMAL
N. GONORRHOEAE DNA: NEGATIVE
SPECIMEN DESCRIPTION: NORMAL

## 2021-12-14 ENCOUNTER — HOSPITAL ENCOUNTER (OUTPATIENT)
Age: 26
Discharge: HOME | End: 2021-12-14
Payer: COMMERCIAL

## 2021-12-14 DIAGNOSIS — N91.2 AMENORRHEA: ICD-10-CM

## 2021-12-14 DIAGNOSIS — N92.6 MISSED MENSES: ICD-10-CM

## 2021-12-14 LAB
ABO/RH: NORMAL
ABSOLUTE EOS #: 0.09 K/UL (ref 0–0.44)
ABSOLUTE IMMATURE GRANULOCYTE: 0.03 K/UL (ref 0–0.3)
ABSOLUTE LYMPH #: 1.99 K/UL (ref 1.1–3.7)
ABSOLUTE MONO #: 0.45 K/UL (ref 0.1–1.2)
ANTIBODY SCREEN: NEGATIVE
BASOPHILS # BLD: 0 % (ref 0–2)
BASOPHILS ABSOLUTE: <0.03 K/UL (ref 0–0.2)
DIFFERENTIAL TYPE: ABNORMAL
EOSINOPHILS RELATIVE PERCENT: 1 % (ref 1–4)
HCT VFR BLD CALC: 37.4 % (ref 36.3–47.1)
HEMOGLOBIN: 12.4 G/DL (ref 11.9–15.1)
HEPATITIS B SURFACE ANTIGEN: NONREACTIVE
HIV AG/AB: NONREACTIVE
IMMATURE GRANULOCYTES: 0 %
LYMPHOCYTES # BLD: 21 % (ref 24–43)
MCH RBC QN AUTO: 30.2 PG (ref 25.2–33.5)
MCHC RBC AUTO-ENTMCNC: 33.2 G/DL (ref 28.4–34.8)
MCV RBC AUTO: 91.2 FL (ref 82.6–102.9)
MONOCYTES # BLD: 5 % (ref 3–12)
NRBC AUTOMATED: 0 PER 100 WBC
PDW BLD-RTO: 11.9 % (ref 11.8–14.4)
PLATELET # BLD: 222 K/UL (ref 138–453)
PLATELET ESTIMATE: ABNORMAL
PMV BLD AUTO: 10.4 FL (ref 8.1–13.5)
RBC # BLD: 4.1 M/UL (ref 3.95–5.11)
RBC # BLD: ABNORMAL 10*6/UL
RUBV IGG SER QL: 159.5 IU/ML
SEG NEUTROPHILS: 73 % (ref 36–65)
SEGMENTED NEUTROPHILS ABSOLUTE COUNT: 6.74 K/UL (ref 1.5–8.1)
T. PALLIDUM, IGG: NONREACTIVE
WBC # BLD: 9.3 K/UL (ref 3.5–11.3)
WBC # BLD: ABNORMAL 10*3/UL

## 2021-12-14 PROCEDURE — 86850 RBC ANTIBODY SCREEN: CPT

## 2021-12-14 PROCEDURE — 87340 HEPATITIS B SURFACE AG IA: CPT

## 2021-12-14 PROCEDURE — 85025 COMPLETE CBC W/AUTO DIFF WBC: CPT

## 2021-12-14 PROCEDURE — 86900 BLOOD TYPING SEROLOGIC ABO: CPT

## 2021-12-14 PROCEDURE — 86780 TREPONEMA PALLIDUM: CPT

## 2021-12-14 PROCEDURE — 86901 BLOOD TYPING SEROLOGIC RH(D): CPT

## 2021-12-14 PROCEDURE — 36415 COLL VENOUS BLD VENIPUNCTURE: CPT

## 2021-12-14 PROCEDURE — 87389 HIV-1 AG W/HIV-1&-2 AB AG IA: CPT

## 2021-12-14 PROCEDURE — 86762 RUBELLA ANTIBODY: CPT

## 2021-12-27 RX ORDER — SERTRALINE HYDROCHLORIDE 100 MG/1
100 TABLET, FILM COATED ORAL DAILY
Qty: 30 TABLET | Refills: 5 | Status: SHIPPED | OUTPATIENT
Start: 2021-12-27

## 2022-01-18 ENCOUNTER — INITIAL CONSULT (OUTPATIENT)
Dept: ONCOLOGY | Age: 27
End: 2022-01-18
Payer: COMMERCIAL

## 2022-01-18 DIAGNOSIS — Z80.41 FAMILY HISTORY OF OVARIAN CANCER: ICD-10-CM

## 2022-01-18 DIAGNOSIS — Z80.0 FAMILY HISTORY OF COLON CANCER: Primary | ICD-10-CM

## 2022-01-18 DIAGNOSIS — Z80.3 FAMILY HISTORY OF BREAST CANCER: ICD-10-CM

## 2022-01-18 PROCEDURE — 96040 PR GENETIC COUNSELING, EACH 30 MIN: CPT | Performed by: GENETIC COUNSELOR, MS

## 2022-01-18 NOTE — PROGRESS NOTES
[facility] Program   Hereditary Cancer Risk Assessment      Name: Mireille Coates   YOB: 1995   Date of Consultation: 1/18/22      Ms. Rose Tucker was seen at the [facility] for genetic counseling on 1/18/22. Ms. Rose Tucker was referred by Dr. Idalia Clay MD due to her family history of cancer and that her sister was recently found to carry a likely pathogenic CHEK2 gene mutation. PERSONAL AND FAMILY HISTORY   Ms. Rose Tucker is a 32 y.o.  female with no personal history of cancer.      She reports menarche at age 15, she is currently pregnant with her child, and is premenopausal. Ms. Rose Tucker has never had a hysterectomy and both ovaries are intact. She is planning to have a baseline colonoscopy in the near future, after she recovers from the delivery of her baby. She has not required a mammogram or breast biopsy.      FAMILY HISTORY  Ms. Rose Tucker has one twin sister and one older sister. Her older sister underwent genetic testing recently and was found to carry a likely pathogenic CHEK2 gene mutation.      Ms. Lane's mother is living at age 54 with a history of colorectal cancer diagnosed at age 55. Her mother recently underwent genetic testing (68 gene panel) which was negative for any clinically significant mutations. Ms. Rose Tucker has two maternal aunts and one uncle. One aunt had late stage colon cancer and passed away at age 62. Her maternal great grandmother was diagnosed with colon cancer. She relates numerous extended maternal cousins with colon / GI related cancers.      Ms. Yuns father is living at age 61, no cancer. She has one paternal aunt and two uncles. Her aunt was diagnosed with cutaneous melanoma and an uncle was diagnosed with bladder cancer at age 64. Her paternal grandmother was diagnosed with breast and ovarian cancer in her 76s. Her paternal grandfather had melanoma in his [de-identified].       Ms. Rose Tucker reports  ancestry from annual mammograms. CHEK2 carriers are also recommended to undergo colonoscopy screening at least every 5 years. Lastly, we discussed that the results of Ms. Lane's genetic testing may be beneficial in defining her risk for cancer as well as for her family members. SUMMARY & PLAN  1) Ms. Chester Bob meets the NCCN criteria for genetic testing and she elected to proceed with genetic testing for the familial CHEK2 gene mutation. While the CHEK2 gene mutation is likely to have been inherited from her paternal side, she still has a paternal relative with breast and ovarian cancer that is not yet explained by the familial mutation. Thus, multi-gene panel testing is recommended for Ms. Lane. 2) Informed consent was obtained and a blood sample was sent to [facility]. We will call Ms. Lane with results as soon as they are available. A follow up appointment may be recommended. A summary letter with results and final medical management recommendations will be sent once available. A total of 30 minutes were spent face to face with Ms. Chester Bob and 50% of the time was spent educating and counseling. The [address] [place], [place] [address] would be glad to offer our assistance should you have any questions or concerns about this information. Please feel free to contact us at [phone]. Stella Hancock.  Yu Latham MS, [facility]   Licensed Genetic Counselor

## 2022-01-26 ENCOUNTER — ROUTINE PRENATAL (OUTPATIENT)
Dept: OBGYN CLINIC | Age: 27
End: 2022-01-26

## 2022-01-26 VITALS
DIASTOLIC BLOOD PRESSURE: 71 MMHG | HEART RATE: 74 BPM | BODY MASS INDEX: 26.19 KG/M2 | SYSTOLIC BLOOD PRESSURE: 111 MMHG | WEIGHT: 143.2 LBS

## 2022-01-26 DIAGNOSIS — Z52.819: ICD-10-CM

## 2022-01-26 DIAGNOSIS — Z3A.18 18 WEEKS GESTATION OF PREGNANCY: Primary | ICD-10-CM

## 2022-01-26 PROCEDURE — G8427 DOCREV CUR MEDS BY ELIG CLIN: HCPCS | Performed by: STUDENT IN AN ORGANIZED HEALTH CARE EDUCATION/TRAINING PROGRAM

## 2022-01-26 PROCEDURE — 0502F SUBSEQUENT PRENATAL CARE: CPT | Performed by: STUDENT IN AN ORGANIZED HEALTH CARE EDUCATION/TRAINING PROGRAM

## 2022-01-26 PROCEDURE — 1036F TOBACCO NON-USER: CPT | Performed by: STUDENT IN AN ORGANIZED HEALTH CARE EDUCATION/TRAINING PROGRAM

## 2022-01-26 PROCEDURE — G8482 FLU IMMUNIZE ORDER/ADMIN: HCPCS | Performed by: STUDENT IN AN ORGANIZED HEALTH CARE EDUCATION/TRAINING PROGRAM

## 2022-01-26 PROCEDURE — G8419 CALC BMI OUT NRM PARAM NOF/U: HCPCS | Performed by: STUDENT IN AN ORGANIZED HEALTH CARE EDUCATION/TRAINING PROGRAM

## 2022-01-26 NOTE — PROGRESS NOTES
Prenatal Visit    Fernanda Mackay is a 32 y.o. female  at 18w0d    The patient was seen and evaluated. Reports positive fetal movements. She denies headache, vision changes, RUQ pain, contractions, vaginal bleeding and leakage of fluid. The patient already received the influenza vaccine this year. The problem list reflects the active issues addressed during today's visit    Vitals:    BP: 111/71  Weight: 143 lb 3.2 oz (65 kg)  Pulse: 74  Fetal Heart Rate: 144  Movement: Present     Labs: The patient is RH +, Rhogam not indicated  ABO/Rh   Date Value Ref Range Status   2021 B POSITIVE  Final         Assessment & Plan:  Fernanda Mackay is a 32 y.o. female  at 19w0d   - NIPT Low risk, male   - Anatomy Scan ordered and reviewed with patient.    - Next [facility] appointment 22   - Booster due in February      Patient Active Problem List    Diagnosis Date Noted    Pregnancy through Egg Donor 2022    Depression 2021    ASCUS with positive high risk HPV cervical 2021     ASCUS + other high risk HPV       Neck pain 2021    Thoracic back sprain 2021     Return in about 4 weeks (around 2022) for Elizabeth Guidry 9038.         DO Jackeline Dinh Ob/Gyn   2022, 3:59 PM

## 2022-01-28 ENCOUNTER — TELEPHONE (OUTPATIENT)
Dept: PRIMARY CARE CLINIC | Age: 27
End: 2022-01-28

## 2022-01-29 NOTE — TELEPHONE ENCOUNTER
----- Message from [employer] sent at 1/28/2022  2:07 PM EST -----  Subject: Message to Provider    QUESTIONS  Information for Provider? Patient is calling to see if the Dr can call in   a steroid for her cough that just started. Patient is negative for covid. Patient's OB told patient to ask her pcp.  ---------------------------------------------------------------------------  --------------  CALL BACK INFO  What is the best way for the office to contact you? OK to leave message on   voicemail  Preferred Call Back Phone Number? [phone]  ---------------------------------------------------------------------------  --------------  SCRIPT ANSWERS  Relationship to Patient?  Self

## 2022-02-07 ENCOUNTER — TELEPHONE (OUTPATIENT)
Dept: ONCOLOGY | Age: 27
End: 2022-02-07

## 2022-02-07 PROBLEM — Z91.89 HIGH RISK FOR COLON CANCER: Status: ACTIVE | Noted: 2022-02-07

## 2022-02-07 PROBLEM — Z91.89 INCREASED RISK OF BREAST CANCER: Status: ACTIVE | Noted: 2022-02-07

## 2022-02-07 NOTE — TELEPHONE ENCOUNTER
[employer] [facility]   Hereditary Cancer Risk Assessment      Name: Rocio Urbina  YOB: 1995  Date of Results Disclosure: 02/07/22      HISTORY   Ms. Louise Rider was seen for genetic counseling at the request of Dr. Bibiana Clemente MD due to her family history of various cancers and that her sister was recently found to carry a likely pathogenic CHEK2 gene mutation. At that time, Ms. Louise Rider chose to pursue genetic testing CancerNext Expanded + RNA gene panel gene panel. These results were discussed with Ms. Louise Rider via telephone. A summary of Ms. Lane's results and recommendations are below. 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)  This panel included the analysis of 77 genes associated with hereditary cancer including: AIP, ALK, APC, SANTHOSH, BAP1, BARD1, BLM, BMPR1A, BRCA1, BRCA2, BRIP1, CDC73, CDH1, CDK4, CDKN1B, CDKN2A, CHEK2, CTNNA1, DICER1, EGFR, EGLN1, EPCAM, FANCC, FH, FLCN, GALNT12, GREM1, HOXB13, KIF1B, KIT1, LZTR1, MAX, MEN1, MET, MITF, MLH1, MSH2, MSH3, MSH6, MUTYH, NBN, NF1, NF2, NTHL1, PALB2, PDGFRA, PHOX2B, PMS2, POLD1, POLE, POT1, IEFNK3D, PTCH1, PTEN, RAD51C, RAD51D, RB1, RECQL, RET, SDHA, SDHAF2, SDHB, SDHC, ,SDHD, SMAD4, SMARCA4, SMARCB1, SMARCE1, STK11, SUFU, OXWW020, TP53, TSC1, TSC2, VHL, and XRCC2. In addition, no clinically relevant aberrant RNA transcripts were detected in select genes. Please refer to genetic test report for technical details. Additional findings: VARIANT OF UNCERTAIN SIGNIFICANCE - SMARCA4 p.[id number]  A variant of uncertain significance (VUS) occurs when the laboratory does not have enough data to determine whether the genetic variant is benign (not associated with cancer) or pathogenic (associated with cancer). Because the significance of the SMARCA4 gene VUS is unknown, medical management must be based on Ms. Lane's personal history and family history of cancer. Ms. Solange Hernandez results identified a pathogenic CHEK2 gene mutation which is associated with an increased risk for the development of certain cancers as outlined below. General Population CHEK2 Carriers    Female Breast  12% 15-40%   Colorectal Cancer  5% 11-18%     Other studies have suggested an increased risk for other cancers, including prostate, gastric, and thyroid cancers. However, these studies are not conclusive and there are no medical management guidelines to address these risks. Recommendations for the management of breast and colorectal cancer risk are summarized below. RECOMMENDATIONS  Individuals who carry a CHEK2 mutation are encouraged to follow the [employer] (NCCN) Version 2.2021 guidelines for cancer screening and prevention as outlined below. FEMALE BREAST CANCER      NCCN Recommendation Age to Begin Frequency    Breast awareness - Women should be familiar with their breasts and promptly report changes to their healthcare provider. Periodic, consistent breast self-examination (BSE) may be beneficial  Individualized  N/A    Clinical Breast Examination  36years old* Every 6-12 months   Consider breast MRI with contrast  36years old*  Annual   Mammogram (consider tomosynthesis)  36years old*  Annual    Risk reducing mastectomy considered on an individual basis including personal and family history of cancer Individualized  N/A   Consider risk reducing agents, such as chemoprevention (i.e. Tamoxifen)  Individualized  N/A    *age to begin screening based on the ages of breast cancer diagnoses in Ms. Lane's family. COLON CANCER      NCCN Recommendation Age to Begin Frequency   Colonoscopy  36years old or earlier based on family history  Every 5 years    *age to begin screening based on the ages of colon cancer diagnoses in Ms. Yuns family.     OTHER CANCER     At this time, there is no clear evidence that suggests individuals cancer, sister with colon polyps in her 25s), we recommend that she consult with a GI specialist in the near future to discuss the most appropriate colon cancer surveillance plan. 4) We encourage Ms. Lane to share her genetic test results with her family members. Support and resources were given to Ms. aLne, including [employer]. She is encouraged to contact her genetic counselor for any additional questions or support. 5) We encourage Ms. Lane to contact us with updates to her personal and/or familys cancer history as this information may alter our assessment and/or recommendations. The [address] [place] [address] [employer] [facility] would be glad to offer our assistance should you have any questions or concerns about this information. Please feel free to contact us at [phone]. Gayle Piedra.  Alexis Craft Kirk [address], [facility]   Licensed Genetic Counselor       CC:   MD Skyla Stewart,

## 2022-02-09 ENCOUNTER — ROUTINE PRENATAL (OUTPATIENT)
Dept: PERINATAL CARE | Age: 27
End: 2022-02-09
Payer: COMMERCIAL

## 2022-02-09 ENCOUNTER — TELEPHONE (OUTPATIENT)
Dept: OBGYN CLINIC | Age: 27
End: 2022-02-09

## 2022-02-09 ENCOUNTER — HOSPITAL ENCOUNTER (OUTPATIENT)
Age: 27
Setting detail: SPECIMEN
Discharge: HOME | End: 2022-02-09
Payer: COMMERCIAL

## 2022-02-09 VITALS
WEIGHT: 145 LBS | RESPIRATION RATE: 16 BRPM | SYSTOLIC BLOOD PRESSURE: 127 MMHG | HEART RATE: 86 BPM | HEIGHT: 62 IN | DIASTOLIC BLOOD PRESSURE: 71 MMHG | BODY MASS INDEX: 26.68 KG/M2 | TEMPERATURE: 97.2 F

## 2022-02-09 DIAGNOSIS — O35.8XX0 SUSPECTED DAMAGE TO FETUS FROM DISEASE IN MOTHER, ANTEPARTUM CONDITION, SINGLE OR UNSPECIFIED FETUS: Primary | ICD-10-CM

## 2022-02-09 DIAGNOSIS — O09.812 HIGH RISK PREGNANCY DUE TO ASSISTED REPRODUCTIVE TECHNOLOGY IN SECOND TRIMESTER: ICD-10-CM

## 2022-02-09 DIAGNOSIS — Z3A.20 20 WEEKS GESTATION OF PREGNANCY: ICD-10-CM

## 2022-02-09 DIAGNOSIS — Z36.86 ENCOUNTER FOR SCREENING FOR RISK OF PRE-TERM LABOR: ICD-10-CM

## 2022-02-09 LAB
ABDOMINAL CIRCUMFERENCE: NORMAL
BIPARIETAL DIAMETER: NORMAL
ESTIMATED FETAL WEIGHT: NORMAL
FEMORAL DIAMETER: NORMAL
HC/AC: NORMAL
HEAD CIRCUMFERENCE: NORMAL

## 2022-02-09 PROCEDURE — 36415 COLL VENOUS BLD VENIPUNCTURE: CPT

## 2022-02-09 PROCEDURE — 76811 OB US DETAILED SNGL FETUS: CPT | Performed by: OBSTETRICS & GYNECOLOGY

## 2022-02-09 PROCEDURE — 76817 TRANSVAGINAL US OBSTETRIC: CPT | Performed by: OBSTETRICS & GYNECOLOGY

## 2022-02-09 PROCEDURE — 82105 ALPHA-FETOPROTEIN SERUM: CPT

## 2022-02-09 PROCEDURE — G8482 FLU IMMUNIZE ORDER/ADMIN: HCPCS | Performed by: OBSTETRICS & GYNECOLOGY

## 2022-02-09 PROCEDURE — G8427 DOCREV CUR MEDS BY ELIG CLIN: HCPCS | Performed by: OBSTETRICS & GYNECOLOGY

## 2022-02-09 PROCEDURE — G8419 CALC BMI OUT NRM PARAM NOF/U: HCPCS | Performed by: OBSTETRICS & GYNECOLOGY

## 2022-02-09 PROCEDURE — 99243 OFF/OP CNSLTJ NEW/EST LOW 30: CPT | Performed by: OBSTETRICS & GYNECOLOGY

## 2022-02-09 NOTE — TELEPHONE ENCOUNTER
Pt called she is 20 weeks pregnant and came in contact with someone with shingles she is wondering if there is any precautions she needs to take

## 2022-02-12 LAB
AFP INTERPRETATION: NORMAL
AFP MOM: 1.11
AFP SPECIMEN: NORMAL
AFP: 68 NG/ML
DATE OF BIRTH: NORMAL
DATING METHOD: NORMAL
DETERMINED BY: NORMAL
DIABETIC: NORMAL
DONOR EGG?: NO
DUE DATE: NORMAL
ESTIMATED DUE DATE: NORMAL
FAMILY HISTORY NTD: NO
GESTATIONAL AGE: NORMAL
IN VITRO FERTILIZATION: NO
INSULIN REQ DIABETES: NORMAL
LAST MENSTRUAL PERIOD: NORMAL
MATERNAL AGE AT EDD: 26.6 YR
MATERNAL WEIGHT: 145
MONOCHORIONIC TWINS: NORMAL
NUMBER OF FETUSES: NORMAL
PATIENT WEIGHT UNITS: NORMAL
PATIENT WEIGHT: NORMAL
RACE (MATERNAL): NORMAL
RACE: NORMAL
REPEAT SPECIMEN?: NO
SMOKING: NO
SMOKING: NO
VALPROIC/CARBAMAZEP: NORMAL
ZZ NTE CLEAN UP: HISTORY: NO

## 2022-02-14 ENCOUNTER — TELEPHONE (OUTPATIENT)
Dept: OBGYN CLINIC | Age: 27
End: 2022-02-14

## 2022-02-22 ENCOUNTER — ROUTINE PRENATAL (OUTPATIENT)
Dept: OBGYN CLINIC | Age: 27
End: 2022-02-22

## 2022-02-22 VITALS — SYSTOLIC BLOOD PRESSURE: 112 MMHG | WEIGHT: 148 LBS | DIASTOLIC BLOOD PRESSURE: 64 MMHG | BODY MASS INDEX: 27.07 KG/M2

## 2022-02-22 DIAGNOSIS — M54.42 CHRONIC LOW BACK PAIN WITH BILATERAL SCIATICA, UNSPECIFIED BACK PAIN LATERALITY: Primary | ICD-10-CM

## 2022-02-22 DIAGNOSIS — Z52.819: ICD-10-CM

## 2022-02-22 DIAGNOSIS — M54.41 CHRONIC LOW BACK PAIN WITH BILATERAL SCIATICA, UNSPECIFIED BACK PAIN LATERALITY: Primary | ICD-10-CM

## 2022-02-22 DIAGNOSIS — Z3A.21 21 WEEKS GESTATION OF PREGNANCY: ICD-10-CM

## 2022-02-22 DIAGNOSIS — G89.29 CHRONIC LOW BACK PAIN WITH BILATERAL SCIATICA, UNSPECIFIED BACK PAIN LATERALITY: Primary | ICD-10-CM

## 2022-02-22 PROCEDURE — 1036F TOBACCO NON-USER: CPT | Performed by: NURSE PRACTITIONER

## 2022-02-22 PROCEDURE — G8482 FLU IMMUNIZE ORDER/ADMIN: HCPCS | Performed by: NURSE PRACTITIONER

## 2022-02-22 PROCEDURE — G8427 DOCREV CUR MEDS BY ELIG CLIN: HCPCS | Performed by: NURSE PRACTITIONER

## 2022-02-22 PROCEDURE — 0502F SUBSEQUENT PRENATAL CARE: CPT | Performed by: NURSE PRACTITIONER

## 2022-02-22 PROCEDURE — G8419 CALC BMI OUT NRM PARAM NOF/U: HCPCS | Performed by: NURSE PRACTITIONER

## 2022-02-22 NOTE — PROGRESS NOTES
+FM, -Ctx, -LOF, -VB  Patient Active Problem List   Diagnosis    Neck pain    Thoracic back sprain    ASCUS with positive high risk HPV cervical    Depression    Pregnancy through Egg Donor    High risk for colon cancer    Increased risk of breast cancer     Blood pressure 112/64, weight 148 lb (67.1 kg), last menstrual period 09/22/2021. Reviewed kick counts, labor and pre eclampsia precautions  Feeling well  Good FM  Having some back pain.  Referral for PT and back brace sent

## 2022-02-22 NOTE — PATIENT INSTRUCTIONS
Patient Education        Weeks 22 to 26 of Your Pregnancy: Care Instructions  Overview     As you enter your 7th month of pregnancy at week 26, your baby's lungs are growing stronger and getting ready to breathe. You may notice that your baby responds to the sound of your voice. You may also notice that your baby does less turning and twisting and more squirming, kicking, or jerking. Jerking often means that your baby has hiccups. Hiccups are normal and are only temporary. You may want to think about attending a childbirth preparation class. This is also a good time to start thinking about whether you want to have pain medicine during labor. You may be tested for gestational diabetes between weeks 25 and 28. Gestational diabetes occurs when your blood sugar level gets too high when you're pregnant. The test is important, because you can have gestational diabetes and not know it. But the condition can cause problems for your baby. Follow-up care is a key part of your treatment and safety. Be sure to make and go to all appointments, and call your doctor if you are having problems. It's also a good idea to know your test results and keep a list of the medicines you take. How can you care for yourself at home? Ease discomfort from your baby's kicking  · Change your position. Sometimes this will cause your baby to change position too. · Take a deep breath while you raise your arm over your head. Then breathe out while you drop your arm. Do Kegel exercises to prevent urine from leaking  · You can do Kegel exercises while you stand or sit. ? Squeeze the same muscles you would use to stop your urine. Your belly and thighs should not move. ? Hold the squeeze for 3 seconds, and then relax for 3 seconds. ? Start with 3 seconds. Then add 1 second each week until you are able to squeeze for 10 seconds. ? Repeat the exercise 10 to 15 times for each session. Do three or more sessions each day.   Ease or reduce swelling in your feet, ankles, hands, and fingers  · If your fingers are puffy, take off your rings. · Do not eat high-salt foods, such as potato chips. · Prop up your feet on a stool or couch as much as possible. Sleep with pillows under your feet. · Do not stand for long periods of time or wear tight shoes. · Wear support stockings. Where can you learn more? Go to https://Fight My MonsterpeitBiteb.Telerad Express. org and sign in to your Zazoo account. Enter G264 in the Decoholic box to learn more about \"Weeks 22 to 26 of Your Pregnancy: Care Instructions. \"     If you do not have an account, please click on the \"Sign Up Now\" link. Current as of: June 16, 2021               Content Version: 13.1  © 6567-4931 Healthwise, [employer]. Care instructions adapted under license by [facility] ([facility]). If you have questions about a medical condition or this instruction, always ask your healthcare professional. [place] [address] any warranty or liability for your use of this information. Patient Education        Learning About When to Call Your Doctor During Pregnancy (After 20 Weeks)  Overview  It's common to have concerns about what might be a problem when you're pregnant. Most pregnancies don't have any serious problems. But it's still important to know when to call your doctor if you have certain symptoms or signs of labor. These are general suggestions. Your doctor may give you some more information about when to call. When to call your doctor (after 20 weeks)  Call 911  anytime you think you may need emergency care. For example, call if:  · You have severe vaginal bleeding. · You have sudden, severe pain in your belly. · You passed out (lost consciousness). · You have a seizure. · You see or feel the umbilical cord.   · You think you are about to deliver your baby and can't make it safely to the hospital.  Call your doctor now or seek immediate medical care if:  · You have vaginal bleeding. · You have belly pain. · You have a fever. · You have symptoms of preeclampsia, such as:  ? Sudden swelling of your face, hands, or feet. ? New vision problems (such as dimness, blurring, or seeing spots). ? A severe headache. · You have a sudden release of fluid from your vagina. (You think your water broke.)  · You think that you may be in labor. This means that you've had at least 6 contractions in an hour. · You notice that your baby has stopped moving or is moving much less than normal.  · You have symptoms of a urinary tract infection. These may include:  ? Pain or burning when you urinate. ? A frequent need to urinate without being able to pass much urine. ? Pain in the flank, which is just below the rib cage and above the waist on either side of the back. ? Blood in your urine. Watch closely for changes in your health, and be sure to contact your doctor if:  · You have vaginal discharge that smells bad. · You have skin changes, such as:  ? A rash. ? Itching. ? Yellow color to your skin. · You have other concerns about your pregnancy. If you have labor signs at 37 weeks or more  If you have signs of labor at 37 weeks or more, your doctor may tell you to call when your labor becomes more active. Symptoms of active labor include:  · Contractions that are regular. · Contractions that are less than 5 minutes apart. · Contractions that are hard to talk through. Follow-up care is a key part of your treatment and safety. Be sure to make and go to all appointments, and call your doctor if you are having problems. It's also a good idea to know your test results and keep a list of the medicines you take. Where can you learn more? Go to https://GramVaanisudhakar.B[employer]lett Holdings. org and sign in to your Squirro account. Enter  in the "[employer] box to learn more about \"Learning About When to Call Your Doctor During Pregnancy (After 20 Weeks). \"     If you do not have an account, please click on the \"Sign Up Now\" link. Current as of: June 16, 2021               Content Version: 13.1  © 2006-2021 Fashiolista. Care instructions adapted under license by [facility] ([facility]). If you have questions about a medical condition or this instruction, always ask your healthcare professional. [address] any warranty or liability for your use of this information. Patient Education        Sciatica: Care Instructions  Your Care Instructions     Sciatica (say \"ruz-KX-qh-kuh\") is an irritation of one of the sciatic nerves, which come from the spinal cord in the lower back. The sciatic nerves and their branches extend down through the buttock to the foot. Sciatica can develop when an injured disc in the back irritates or presses against a spinal nerve root. Its main symptom is pain, numbness, or weakness that is often worse in the leg or foot than in the back. Sciatica often will improve and go away with time. Early treatment usually includes medicines and exercises to relieve pain. Follow-up care is a key part of your treatment and safety. Be sure to make and go to all appointments, and call your doctor if you are having problems. It's also a good idea to know your test results and keep a list of the medicines you take. How can you care for yourself at home? · Take pain medicines exactly as directed. ? If the doctor gave you a prescription medicine for pain, take it as prescribed. ? If you are not taking a prescription pain medicine, ask your doctor if you can take an over-the-counter medicine. · Use heat or ice to relieve pain. ? To apply heat, put a warm water bottle, heating pad set on low, or warm cloth on your back. Do not go to sleep with a heating pad on your skin. ? To use ice, put ice or a cold pack on the area for 10 to 20 minutes at a time. Put a thin cloth between the ice and your skin.   · Avoid sitting if possible, unless it feels better than standing. · Alternate lying down with short walks. Increase your walking distance as you are able to without making your symptoms worse. · Do not do anything that makes your symptoms worse. When should you call for help? Call 911 anytime you think you may need emergency care. For example, call if:    · You are unable to move a leg at all. Call your doctor now or seek immediate medical care if:    · You have new or worse symptoms in your legs or buttocks. Symptoms may include:  ? Numbness or tingling. ? Weakness. ? Pain.     · You lose bladder or bowel control. Watch closely for changes in your health, and be sure to contact your doctor if:    · You are not getting better as expected. Where can you learn more? Go to https://Wi-Chi.HiLo Tickets. org and sign in to your [email or web address] account. Enter [phone] in the formerly [facility] box to learn more about \"Sciatica: Care Instructions. \"     If you do not have an account, please click on the \"Sign Up Now\" link. Current as of: July 1, 2021               Content Version: 13.1  © 9628-9622 streamOnce. Care instructions adapted under license by [facility] ([facility]). If you have questions about a medical condition or this instruction, always ask your healthcare professional. [address] any warranty or liability for your use of this information. Patient Education        Sciatica: Exercises  Introduction  Here are some examples of typical rehabilitation exercises for your condition. Start each exercise slowly. Ease off the exercise if you start to have pain. Your doctor or physical therapist will tell you when you can start these exercises and which ones will work best for you. When you are not being active, find a comfortable position for rest. Some people are comfortable on the floor or a medium-firm bed with a small pillow under their head and another under their knees.  Some people prefer to lie on their side with a pillow between their knees. Don't stay in one position for too long. Take short walks (10 to 20 minutes) every 2 to 3 hours. Avoid slopes, hills, and stairs until you feel better. Walk only distances you can manage without pain, especially leg pain. How to do the exercises  Back stretches    1. Get down on your hands and knees on the floor. 2. Relax your head and allow it to droop. Round your back up toward the ceiling until you feel a nice stretch in your upper, middle, and lower back. Hold this stretch for as long as it feels comfortable, or about 15 to 30 seconds. 3. Return to the starting position with a flat back while you are on your hands and knees. 4. Let your back sway by pressing your stomach toward the floor. Lift your buttocks toward the ceiling. 5. Hold this position for 15 to 30 seconds. 6. Repeat 2 to 4 times. Follow-up care is a key part of your treatment and safety. Be sure to make and go to all appointments, and call your doctor if you are having problems. It's also a good idea to know your test results and keep a list of the medicines you take. Where can you learn more? Go to https://Virtual Air Guitar Company.SEJENT. org and sign in to your [employer] Corporation account. Enter P636 in the IndigoVision box to learn more about \"Sciatica: Exercises. \"     If you do not have an account, please click on the \"Sign Up Now\" link. Current as of: July 1, 2021               Content Version: 13.1  © 2006-2021 Healthwise, Incorporated. Care instructions adapted under license by [facility] ([facility]). If you have questions about a medical condition or this instruction, always ask your healthcare professional. [place] [address] any warranty or liability for your use of this information.

## 2022-03-03 ENCOUNTER — HOSPITAL ENCOUNTER (OUTPATIENT)
Dept: PHYSICAL THERAPY | Facility: CLINIC | Age: 27
Setting detail: THERAPIES SERIES
Discharge: HOME | End: 2022-03-03
Payer: COMMERCIAL

## 2022-03-03 PROCEDURE — 97161 PT EVAL LOW COMPLEX 20 MIN: CPT

## 2022-03-03 PROCEDURE — 97110 THERAPEUTIC EXERCISES: CPT

## 2022-03-03 PROCEDURE — 97140 MANUAL THERAPY 1/> REGIONS: CPT

## 2022-03-03 NOTE — CONSULTS
[] Thang Pham        Outpatient Physical                Therapy       [address]       Phone: [phone]       Fax: [phone] [x] [facility] at [address]       Phone: [phone]       Fax: [phone] [] [place] [address] [place] [address] [facility]    [address] [place] [address]     Phone: [phone]     Fax:  [phone]     Physical Therapy Pelvic Floor Evaluation    Date:  3/3/2022  Patient: Ninoska Caraballo  : 1995  MRN: [id number]  Physician: Hemant DAILY- WILIAM     Insurance: [employer](first) [employer] (second); 30 vs per year   Medical Diagnosis:   Z3A.21 (ICD-10-CM) - 21 weeks gestation of pregnancy   M54.41, G89.29, M54.42 (ICD-10-CM) - Chronic low back pain with bilateral sciatica, unspecified back pain laterality         Rehab Codes: M54.49, M62.81, R29.3  Onset Date: 10+ weeks ago, ~12/15/2021                                    Subjective:   CC:Pt is a 31 yo female who presents with complaints of significant back pain and bilateral pain with sciatic symptoms. HPI: (onset date: )  Has had back pain for as long as she can remember. She is having most pain in her sacral region and into the tailbone. Pt reports that she would get sciatica a few times a month before pregnancy. Now, she reports that it will come a few times a week and notes it is bilateral but worse on the R side. Pt notes that is starts through the knee cap, into the hip and down into foot.      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), 8 hours per day           PMHx:   Past Medical History:   Diagnosis Date    Abnormal Pap smear of cervix     Anxiety     Attention deficit disorder (ADD) without hyperactivity     child but never treated    Depression     Encounter for assisted reproductive fertility cycle     IUI, partner female    History of depression     Neurologic disorder     fingers on right hand, fingers go number                Medications:   Current Outpatient Medications on File Prior to Encounter   Medication Sig Dispense Refill    sertraline (ZOLOFT) 100 MG tablet Take 1 tablet by mouth daily 30 tablet 5    Docosahexaenoic Acid (PRENATAL DHA PO) Take by mouth       No current facility-administered medications on file prior to encounter. Allergies: Allergies   Allergen Reactions    Sulfa Antibiotics Itching and Other (See Comments)     Burning internally    Sulfamethoxazole-Trimethoprim               Surgical    History of abdominal surgeries? [] Yes  [x] No [] Other    If yes, please list:    History of orthopedic surgeries?  [] Yes  [x] No [] Other    If yes, please list:   Past Surgical History:   Procedure Laterality Date    HYMENECTOMY         Gynecological     OB History        1    Para   0    Term   0       0    AB   0    Living   0       SAB   0    IAB   0    Ectopic   0    Molar   0    Multiple   0    Live Births   0               Pregnancy:        Urinary   General urinary symptoms: cough, laugh and sneeze    Frequency of urination (# of times per waking hours): a few    Nighttime Urination (# of times per night): 1-2   Urge Control ( how long can you hold urine once urge strikes): sometimes is urgent at work   Pain with urination:  [x] No   Episodes of leakage (# Per day, per week, etc): mild to moderate    Activity during leakage: cough, laugh and sneeze   History of UTIs (more than 4 in a year):[] Yes  [x] No [] Other   History of yeast infections (more than 4 year): [] Yes  [x] No [] Other      LISA:   Bowel   General bowel symptoms:  [x] none : undiagonsed IBS, normal-diarrhea-constipation, shorter intervals    Sometimes uses a stool to use the restroom    Frequency of stool (# of bowel movements per  day, week, etc): daily   Sexual function/Pelvic pain:    Relationship status: [x]       Type of partner: [x] Female       *Partner name (if relevant): Donavon Laurence  Sexually active currently?: [x] Yes         Pain with intercourse:  [x] sometimes        Able to achieve orgasm: [x] Yes   Musculoskeletal screen:    Any issues with [] Low back  [] Thoracic  [] Cervical  []  Hip [] Pelvis [] Other     Health/behavior:   Fiber Intake (g): not assessed, will next visit    Water intake (oz): not assessed   Physically active: [] Yes  [x] No [] Other    If yes, what current activities do you engage in? Does not engage in exercise, is very active with nature of job   Dietary Restrictions: none   Bladder irritants, etc: not assessed     Psychosocial screen:   Referral needed:   [x] No    Symptoms:  [x] Worsening   Better: stretching   Worse: sleep, after work  Sleep: [] OK [x] Disturbed  Pain: [] Yes [x] No Location:  Pain Rating: (0-10 scale)not specifically rated  /10  Pain altered Tx: [] Yes [x] No Action:      Working: [x] Normal Duty   Job/ADL Description: OP surgical nurse       Pt's Goal for therapy: To help with back pain       Objective:    Consent: Patient verbally consented to external assessment  with no red flags present 3/3/2022. Patient was appropriately draped and only areas that were being treated were exposed. Therapist provided detailed explanation of treatment prior to initiation of session. Patient verbalized and demonstrated understanding and provided verbal consent. Consent was checked and received prior to initiating different treatment techniques and checked frequently throughout session. Chaperone for Intimate Exam   Chaperone was offered as part of the rooming process. Patient declined and agrees to continue with exam without a chaperone.     OBSERVATION No Deficit Deficit Not Tested Comments   Posture       Forward Head [] [x] []    Rounded Shoulders [] [x] []    Kyphosis [] [x] []    Lordosis [] [x] []    Lateral Shift [] [x] [] Towards left    Scoliosis [] [x] [] Mild, thoracolumbar curve right convexity    Iliac Crest [x] [] []    PSIS [x] [] []    ASIS [x] [] []    Genu Valgus [] [x] [] Mild    Genu Varus [] [] [x]    Genu Recurvatum [] [x] []    Pronation [] [] [x]    Supination [] [] [x]    Leg Length Discrp [] [x] [] 79 cm on left from ASIS to medial mal  80.5cm on right from ASIS to medial mal    Slumped Sitting [x] [] []    Palpation [] [x] []    Sensation [x] [] []    Functional  [] [x] []    Diaphragmatic Breathing [] [x] []    Glute activation  [] [x] []    Transverse abdominis activation  [] [x] []    Neurological [x] [] [] No dermatome or myotome deficits    Gait    Analysis:   Balance    L:  R:     Sacroiliac Joint and Pelvic Alignment:  Increased WB onto left side, lateral lean on left side when feet are shoulder width apart, with feet together, no pelvis alignment deficits noted, RLE knee flexion     Diastasis Rectus Abdominis (AMEE): [x] Present - not formally measured d/t pregnancy      Bulge present: [x] Present     Abdominal wall assessment:    Scar (s) present [x]  Absent      t    Abdominal Core Strength:   Sahrmann Score: 0.25     Thoracolumbar mobility screen:        Lumbar/Hip:    Special Tests     left right   Sciatic nerve test +  +    PSLR + +   ASLR neg Neg    SI joint cluster  Neg  Neg    Scours  neg neg   edmundo neg neg             STRENGTH     Left Right   Hip Flex 4 4   Ext  NT NT   ABD 4+ 4+             FUNCTION  Normal  Difficult  Unable    Cough/Sneeze   X    Supine-Sit  X     Squatting   X    Bending/ stooping   X    Stairs   X    Hopping      X   Jumping      X   Running     X    Lifting/carrying     X      Palpation:  Painful/tender to palpation along glute tissue bilaterally, sacral LDL       Assessment: Pt is a 33 y/o female who is pregnant. Pt presents to physical therapy with c/o  and symptoms of back and hip pain with radicular symptoms down BLEs. Pt is moderate irritability and has been experiencing symptoms for 10+ weeks (duration).  Pt's signs and symptoms are consistent with low back pain with radicular symptoms and SI joint dysfunction/PGP Pt has pain with transitional movements, pain with bed mobility and disrupted sleep Pt has difficulty with participation in  with functional limitations in completion of work ADLs and exercise. Pt will benefit from skilled physical therapy intervention due to improve stabilization for pelvic girdle, address postural deficits and for pain management. Pt has fair prognosis for therapy if compliant. Pt was instructed in home exercise program with good understanding, will review and advance as able in subsequent visits. Pt was educated on expectation and prognosis for therapy using moderate cueing; pt needs review. Problems:   [x] ? Pain:  [x] ? ROM:  [x] ? Strength:  [x] ? Function:      STG (to be met in 6 treatments):   1. Pt to report less than 4/10 pain at worst for improved quality of life. 2. Pt to report radicular signs/symptoms to reduce to 30% for improved sleep. 3. Pt to exhibit [facility] for AROM for lumbar spine, pain free for improved spinal mobility/function. 4. Pt to score 6% decrease on the [facility] [place] functional outcome measure to indicate improved function and mobility. 5. Pt to report compliance with heel lift and decreased back pain related to use of heel lift in left shoe to aide in posture and stability. 6. Pt to wear abdominal brace or back support with no cues to improve pain. 7. Pt to demonstrate proper pelvic brace (transverse abdominis activation+ exhale  + pelvic floor contraction) as palpated by therapist to ensure lumbar stabilization with functional activities (lifting, sitting, etc). LTG (to be met in 12 treatments):    1. Pt to report less than 3/10 pain at worst for improved quality of life. 2. Pt to report radicular signs/symptoms to reduce to 40% for improvedsleep   3. Pt to demonstrate proper lifting mechanics with no cueing to reduce risk for injury with completion of ADLs.     4. Pt able to maintain pelvic vrace as palpated with therapist for 10 reps during standing marching to ensure lumbar stabilization with stair climbing. 5. Pt to demonstrate equalized pelvic obliquity via palpation by therapist to reduce abnormal forces through lumbar/SI joint. 6. Pt to score a 12% decrease on the Oswestry outcome measure to indicate improved function and decrease pain with ADL completion. Patient goals: to reduce back pain     Rehab Potential: [x] Fair   Suggested Professional Referral: [x] No   Barriers to Goal Achievement[de-identified]  [x] Yes: had sciatica sx prior to pregnancy  Domestic Concerns: [x] No       Pt. Education: [x] Plans/Goals, Risks/Benefits discussed [x] Home exercise program  Method of Education: [x] Verbal [x] Demo [x] Written  Comprehension of Education:  [x] Verbalizes understanding. [x] Demonstrates understanding. [x] Needs Review. [] Demonstrates/verbalizes understanding of HEP/Ed previously given. Treatment Plan:  [x] Therapeutic Exercise      [] Modalities:  [x] Therapeutic Activity     [] Ultrasound     [] Electrical Stimulation  [x] Gait Training       [x] Massage        [] Lumbar/Cervical Traction  [x] Neuromuscular Re-education   [x] Cold/hotpack  [] Iontophoresis: 4 mg/mL  [x] Instruction in HEP             Dexamethasone Sodium  [x] Manual Therapy             Phosphate 40-80 mAmin  [x] Aquatic Therapy    :       Frequency: 1 x/week for 12 visits      Todays Treatment:  Consent: Patient verbally consented to external manual work with no red flags present 3/3/2022. Patient was appropriately draped and only areas that were being treated were exposed. Therapist provided detailed explanation of treatment prior to initiation of session. Patient verbalized and demonstrated understanding and provided verbal consent. Consent was checked and received prior to initiating different treatment techniques and checked frequently throughout session.      Modalities:   Exercises:  Exercise Reps/ Time Weight/ Level Comments Child pose  3 x 30 s          Pelvic rocking 20x          Cat/camel  20x  With breath, avoided cat due to abdominal stretching    Standing left latera shift correction  30x      Education  X     Added heel lift (level 1) for left shoe   Manual  15 min     eHawk  along bilateral glute tissue, sacral borders, and paraspinals    Other:      Specific Instructions for next treatment: lumbar manual work, stabilization exercises       Evaluation Complexity:  History (Personal factors, comorbidities) [] 0 [x] 1-2 [] 3+   Exam (limitations, restrictions) [x] 1-2 [] 3 [] 4+   Clinical presentation (progression) [x] Stable [] Evolving  [] Unstable   Decision Making [x] Low [] Moderate [] High    [x] Low Complexity [] Moderate Complexity [] High Complexity     Treatment Charges: Mins Units   [x] Evaluation(low complex) 25  1    [] Modalities     [x] Ther Exercise 18 1   [x] Manual Therapy 15 1   [] Ther Activities     [] Aquatics         [] Vasocompression         [] Other             TOTAL TREATMENT TIME: 58 min       Time in: 430pm  Time out: 540pm       Electronically signed by: Anirudh Butt, PT    Anirudh Butt PT, DPT   License # [id number]

## 2022-03-14 ENCOUNTER — HOSPITAL ENCOUNTER (OUTPATIENT)
Dept: PHYSICAL THERAPY | Facility: CLINIC | Age: 27
Setting detail: THERAPIES SERIES
Discharge: HOME | End: 2022-03-14
Payer: COMMERCIAL

## 2022-03-14 PROCEDURE — 97530 THERAPEUTIC ACTIVITIES: CPT

## 2022-03-14 PROCEDURE — 97110 THERAPEUTIC EXERCISES: CPT

## 2022-03-14 PROCEDURE — 97140 MANUAL THERAPY 1/> REGIONS: CPT

## 2022-03-14 NOTE — FLOWSHEET NOTE
[] [facility]) - [facility] &  Therapy  [address]  [phone]  F: [phone] [x] [address]  [facility] [address]   [address]  P: [phone]  F: [phone] [] [address] &  Therapy  [address]  P: [phone]  F: [phone] [] [address]  P: [phone]  F: [phone] [] [address]  [facility]   [address]   [place]: [phone]  F: [phone]      Physical Therapy Daily Treatment Note    Date:  3/14/2022  Patient Name:  Awa Hernandez    :  1995  MRN: [id number]  Physician: Radha Iraheta APRN- CNP                    Insurance: [employer](first) [place]. [employer] (second); 30 vs per year   Medical Diagnosis:   Z3A.21 (ICD-10-CM) - 21 weeks gestation of pregnancy   M54.41, G89.29, M54.42 (ICD-10-CM) - Chronic low back pain with bilateral sciatica, unspecified back pain laterality                                Rehab Codes: M54.49, M62.81, R29.3  Onset Date: 10+ weeks ago, ~12/15/2021             Visit# / total visits:3/12    Subjective:    Pain:  [] Yes  [x] No Location: low back  Pain Rating: (0-10 scale) 0/10  Pain altered Tx:  [x] No  [] Yes  Action:    Today, Ena Bravo reports no back pain. Progress made: elimination of pain   Persistent symptoms: occasional buttocks and low back pain   New symptoms: none    Ena Bravo reports \"feeling much better and feels like the exercises are really helping\" following last appointment, and endorses excellent compliance with home exercise program. Ena Bravo no  adverse response to prescribed home exercises/activities. Plan for today's session was verbally explained to patient along with clinical rationale.  Concepción  verbalized agreement with today's plan prior to the initiation of treatment. Objective:  Consent: Patient verbally consented to external manual work with no red flags present 3/3/2022. Patient was appropriately draped and only areas that were being treated were exposed. Therapist provided detailed explanation of treatment prior to initiation of session. Patient verbalized and demonstrated understanding and provided verbal consent. Consent was checked and received prior to initiating different treatment techniques and checked frequently throughout session.      Modalities:   Exercises:  Exercise Reps/ Time Weight/ Level Comments                Standing hip abduction iso  4 x 15 s hold ea          Standing hip ER iso  4 x 15 s hold ea          Standing glute squeezes  10 x 15 s hold ea       Standing left latera shift correction  Review        Pelvic brace seated  10x   Add with functional exercises    Education  X     heel lift (level 1) for left shoe (reviewee)   Reviewed exercises  Education on bracing options  Education on pelvic brace with functional adls      Manual  28 min     eHawk  along bilateral glute tissue, sacral borders, and paraspinals     hypervolt     Kt taping for abdominal support    Other:      Specific Instructions for next treatment: lumbar manual work, stabilization exercises, yoga poses for pain management, birth mechanics        Treatment Charges: Mins Units   []  Modalities     [x]  Ther Exercise 12  1    [x]  Manual Therapy 28  2    [x]  Ther Activities 16 1   []  Aquatics     []  Vasocompression     []  Other     Total Treatment time 56  4        Assessment: [x] Progressing toward goals. Educated pt on pelvic brace with functional adls with pt demonstrating good understanding. Reviewed and education on heel lift and bracing options. Added a few isometrics for home with good follow through and mechanics demonstrated by patient. [] No change.      [] Other:  [x] Patient would continue to benefit from skilled physical therapy services in order to: pregnancy pain management, decreased coordination, birth prep and eliminate pelvic girdle pain. Exercise Reps/ Time Weight/ Level Comments                Child pose  3 x 30 s          Pelvic rocking 20x          Cat/camel  20x   With breath, avoided cat due to abdominal stretching    Standing left latera shift correction  30x        Education  X     Added heel lift (level 1) for left shoe   Manual  15 min     eHawk  along bilateral glute tissue, sacral borders, and paraspinals    Other:      Specific Instructions for next treatment: lumbar manual work, stabilization exercises          Pt. Education:  [x] Yes  [] No  [x] Reviewed Prior HEP/Ed  Method of Education: [x] Verbal  [x] Demo  [x] Written- see chart   Comprehension of Education:  [] Verbalizes understanding. [x] Demonstrates understanding. [x] Needs review. [x] Demonstrates/verbalizes HEP/Ed previously given. Plan: [x] Continue current frequency toward long and short term goals.     [x] Specific Instructions for subsequent treatments: see above       Time In:5:01pm            Time Out: 602pm     Electronically signed by:  Jamey Dunn, PT      Jamey Dunn PT, DPT   License # [id number]

## 2022-03-22 ENCOUNTER — ROUTINE PRENATAL (OUTPATIENT)
Dept: PERINATAL CARE | Age: 27
End: 2022-03-22
Payer: COMMERCIAL

## 2022-03-22 ENCOUNTER — ROUTINE PRENATAL (OUTPATIENT)
Dept: OBGYN CLINIC | Age: 27
End: 2022-03-22

## 2022-03-22 VITALS
SYSTOLIC BLOOD PRESSURE: 120 MMHG | HEIGHT: 62 IN | BODY MASS INDEX: 27.79 KG/M2 | RESPIRATION RATE: 16 BRPM | HEART RATE: 78 BPM | WEIGHT: 151 LBS | TEMPERATURE: 97.3 F | DIASTOLIC BLOOD PRESSURE: 68 MMHG

## 2022-03-22 VITALS
DIASTOLIC BLOOD PRESSURE: 66 MMHG | HEART RATE: 74 BPM | SYSTOLIC BLOOD PRESSURE: 102 MMHG | BODY MASS INDEX: 27.22 KG/M2 | WEIGHT: 148.8 LBS

## 2022-03-22 DIAGNOSIS — Z34.92 PRENATAL CARE, SECOND TRIMESTER: Primary | ICD-10-CM

## 2022-03-22 DIAGNOSIS — O35.8XX0 SUSPECTED DAMAGE TO FETUS FROM DISEASE IN MOTHER, ANTEPARTUM CONDITION, SINGLE OR UNSPECIFIED FETUS: Primary | ICD-10-CM

## 2022-03-22 DIAGNOSIS — Z36.4 ANTENATAL SCREENING FOR FETAL GROWTH RETARDATION USING ULTRASONICS: ICD-10-CM

## 2022-03-22 DIAGNOSIS — Z92.29 COVID-19 VACCINE SERIES COMPLETED: ICD-10-CM

## 2022-03-22 DIAGNOSIS — O09.812 HIGH RISK PREGNANCY DUE TO ASSISTED REPRODUCTIVE TECHNOLOGY IN SECOND TRIMESTER: ICD-10-CM

## 2022-03-22 DIAGNOSIS — Z3A.25 25 WEEKS GESTATION OF PREGNANCY: ICD-10-CM

## 2022-03-22 PROCEDURE — 76816 OB US FOLLOW-UP PER FETUS: CPT | Performed by: OBSTETRICS & GYNECOLOGY

## 2022-03-22 PROCEDURE — 93325 DOPPLER ECHO COLOR FLOW MAPG: CPT | Performed by: OBSTETRICS & GYNECOLOGY

## 2022-03-22 PROCEDURE — 76827 ECHO EXAM OF FETAL HEART: CPT | Performed by: OBSTETRICS & GYNECOLOGY

## 2022-03-22 PROCEDURE — 0502F SUBSEQUENT PRENATAL CARE: CPT | Performed by: OBSTETRICS & GYNECOLOGY

## 2022-03-22 PROCEDURE — 76825 ECHO EXAM OF FETAL HEART: CPT | Performed by: OBSTETRICS & GYNECOLOGY

## 2022-03-22 PROCEDURE — 99999 PR OFFICE/OUTPT VISIT,PROCEDURE ONLY: CPT | Performed by: OBSTETRICS & GYNECOLOGY

## 2022-03-22 NOTE — PROGRESS NOTES
Baron Bonilla is a  @ 25w6d who presents for KAIN visit. She denies LOF, VB or Ctxs.  + FM. She denies any complaints. She denies any fevers/chills, SOB, cough, sore throat, loss of taste/smell or sick contacts. She denies any RUQ pain, HA or vision changes. O:  Vitals:    22 1317   BP: 102/66   Pulse: 74     Gen: NAD  Abd: soft, nontender, gravid  Ext:  no edema      BP: 102/66  Weight: 148 lb 12.8 oz (67.5 kg)  Pulse: 74  Patient Position: Sitting  Fundal Height (cm): 26 cm  Fetal Heart Rate: 145  Movement: Present    A/P:  Patient Active Problem List    Diagnosis Date Noted    COVID-19 vaccine series completed 2022     Booster 3/18      High risk for colon cancer 2022     Start at 36 and every 5 years per genetic testing      Increased risk of breast cancer 2022     Mammogram and MRI yearly at 40 per genetic testing note.  Pregnancy through Egg Donor 2022    Depression 2021    ASCUS with positive high risk HPV cervical 2021     ASCUS + other high risk HPV       Neck pain 2021    Thoracic back sprain 2021     Discussed updated COVID precautions and policies, including but not limited to outpatient testing 3-4 days prior to scheduled delivery or universal rapid screening on L&D for unscheduled delivery unless fully vaccinated. Reviewed updated visitor policy. Encouraged social distancing and appropriate hand washing/hygiene practices. Reviewed symptoms suspicious for COVID infection. Discussed that ACOG, SMFM, and the CDC recommend to not withold immunization in pregnant and breastfeeding women who meet criteria for receipt of the vaccine based on the ACIP recommended priority groups. All questions answered. Patient vocalized understanding.     1 hr GTT & CBC given  Discussed s/sx that should prompt call to the office  Discussed kick counts  RTC in 2 wks    Oanh Tavera MD

## 2022-03-23 ENCOUNTER — HOSPITAL ENCOUNTER (OUTPATIENT)
Dept: PHYSICAL THERAPY | Facility: CLINIC | Age: 27
Setting detail: THERAPIES SERIES
Discharge: HOME | End: 2022-03-23
Payer: COMMERCIAL

## 2022-03-23 PROCEDURE — 97140 MANUAL THERAPY 1/> REGIONS: CPT

## 2022-03-23 NOTE — PROGRESS NOTES
[] [facility]) - [facility] &  Therapy  [address]  [phone]  F: [phone] [x] [address]  [facility]   [address]  P: [phone]  F: [phone] [] [address] &  Therapy  [address] [facility]  P: [phone]  F: [phone] [] [address]  P: [phone]  F: [phone] [] [address]  [facility]   [address]   [place]: [phone]  F: [phone]      Physical Therapy Daily Treatment Note    Date:  3/23/2022  Patient Name:  Valente Zepeda    :  1995  MRN: [id number]  Physician: Dali DAILY- CNP                    Insurance: [employer](first) [employer] (second); 30 vs per year   Medical Diagnosis:   Z3A.21 (ICD-10-CM) - 21 weeks gestation of pregnancy   M54.41, G89.29, M54.42 (ICD-10-CM) - Chronic low back pain with bilateral sciatica, unspecified back pain laterality                                Rehab Codes: M54.49, M62.81, R29.3, R27.9  Onset Date: 10+ weeks ago, ~12/15/2021             Visit# / total visits:    Subjective:    Pain:  [] Yes  [x] No Location: low back  Pain Rating: (0-10 scale) 0/10  Pain altered Tx:  [x] No  [] Yes  Action:    Today, Niki Jesus reports no back pain. Progress made: elimination of pain   Persistent symptoms:none  New symptoms: none    Niki Jesus reports \"feeling excellent and enjoyed the taping\" following last appointment, and endorses excellent compliance with home exercise program. Niki Jesus no  adverse response to prescribed home exercises/activities. Plan for today's session was verbally explained to patient along with clinical rationale. Concepción  verbalized agreement with today's plan prior to the initiation of treatment. She denies red flags related to pregnancy.  Specifically, denies: change in vaginal discharge, vaginal bleeding or spotting, sudden discharge of mucus or gush of fluid, active contractions, headaches, dizziness, fainting. Objective:  Precuations: 26 weeks pregnant  Consent: Patient verbally consented to external work via abdominal taping (belly support) with no red flags present 3/3/2022. Patient was appropriately draped and only areas that were being treated were exposed. Therapist provided detailed explanation of treatment prior to initiation of session. Patient verbalized and demonstrated understanding and provided verbal consent. Consent was checked and received prior to initiating different treatment techniques and checked frequently throughout session.      Modalities:   Exercises:  Exercise Reps/ Time Weight/ Level Comments                                              Education  2 min     Quick review of exercises,  Birth mechanics training session      Manual  6 min   KT taping and demo via video recorder (patient's phone) by another therapist Janneth Trejo PT) with pt's verbal consent. Other:      Specific Instructions for next treatment: PRN, birth mechanics        Treatment Charges: Mins Units   []  Modalities     []  Ther Exercise     [x]  Manual Therapy 6   1    [x]  Ther Activities 2  0    []  Aquatics     []  Vasocompression     []  Other     Total Treatment time 8  1        Assessment: [x] Progressing toward goals. Pt's pain resolved. She denied treatment this date \"as she is feeling great. \" She did request KT taping for abdominal support and via pt's permission (verbal consent), a second PT, Yuko, filmed the taping process to allow pt's wife to repeat the same process for home use. Pt reported adequate understanding. Pt is placed on PRN as pt's symptoms have resolved but she does have >12 weeks left of her pregnancy. Plan to see her in 2 months for birth mechanics. [] No change.      [] Other:  [x] Patient would continue to benefit from skilled physical therapy services in order to: pregnancy pain management, decreased coordination, birth prep and eliminate pelvic girdle pain.       STG (to be met in 6 treatments):              1. Pt to report less than 4/10 pain at worst for improved quality of life. 3/23/2022 MET               2. Pt to report radicular signs/symptoms to reduce to 30% for improved sleep. 3/23/2022 MET               3. Pt to exhibit WFL for AROM for lumbar spine, pain free for improved spinal mobility/function. 3/23/2022 MET               4. Pt to score 6% decrease on the [facility] functional outcome measure to indicate improved function and mobility. 5. Pt to report compliance with heel lift and decreased back pain related to use of heel lift in left shoe to aide in posture and stability. 3/23/2022 MET               6. Pt to wear abdominal brace or back support with no cues to improve pain. 7. Pt to demonstrate proper pelvic brace (transverse abdominis activation+ exhale  + pelvic floor contraction) as palpated by therapist to ensure lumbar stabilization with functional activities (lifting, sitting, etc). 3/23/2022 MET      LTG (to be met in 12 treatments):               1. Pt to report less than 3/10 pain at worst for improved quality of life. 3/23/2022 MET               2. Pt to report radicular signs/symptoms to reduce to 40% for improved sleep. 3/23/2022 MET               3.  Pt to demonstrate proper lifting mechanics with no cueing to reduce risk for injury with completion of ADLs. 4. Pt able to maintain pelvic vrace as palpated with therapist for 10 reps during standing marching to ensure lumbar stabilization with stair climbing. 3/23/2022 MET               5. Pt to demonstrate equalized pelvic obliquity via palpation by therapist to reduce abnormal forces through lumbar/SI joint.  3/23/2022 MET               6. Pt to score a 12% decrease on the Oswestry outcome measure to indicate improved function and decrease pain with ADL completion. Added 3/23/2022   1. Pt to demonstrate independence with home exercise program to maximize potential in therapy and to maintain functional gains/reduction in symptoms. 2. Pt to demonstrate simultaneous activation of TrA and pelvic floor relaxation with diaphragmatic exhale as evidenced by sEMG to optimize pushing mechanics. 3. Pt to independently contract pelvic floor correctly with no cueing or accessory muscle use as observed or palpated by therapist to ensure proper technique for PFM cont' to optimize pelvic floor strength to reduce leakage post partum      Patient goals: to reduce back pain        Pt. Education:  [x] Yes  [] No  [x] Reviewed Prior HEP/Ed  Method of Education: [x] Verbal  [x] Demo  [x] Written- see chart   Comprehension of Education:  [] Verbalizes understanding. [x] Demonstrates understanding. [x] Needs review. [x] Demonstrates/verbalizes HEP/Ed previously given. Plan: [x] current frequency toward long and short term goals remain the same. Pt is on PRN as pt's pain has resolved.      [x] Specific Instructions for subsequent treatments: PRN, birth mechanics   Time In:5:34pm          Time Out: 555pm     Electronically signed by:  Andrea Potts PT      Andrea Potts PT, DPT   License # [id number]

## 2022-03-28 ENCOUNTER — HOSPITAL ENCOUNTER (OUTPATIENT)
Age: 27
Discharge: HOME | End: 2022-03-28
Payer: COMMERCIAL

## 2022-03-28 DIAGNOSIS — Z34.92 PRENATAL CARE, SECOND TRIMESTER: ICD-10-CM

## 2022-03-28 LAB
GLUCOSE ADMINISTRATION: NORMAL
GLUCOSE TOLERANCE SCREEN 50G: 109 MG/DL (ref 70–135)
HCT VFR BLD CALC: 34.5 % (ref 36.3–47.1)
HEMOGLOBIN: 11.2 G/DL (ref 11.9–15.1)
MCH RBC QN AUTO: 29.7 PG (ref 25.2–33.5)
MCHC RBC AUTO-ENTMCNC: 32.5 G/DL (ref 28.4–34.8)
MCV RBC AUTO: 91.5 FL (ref 82.6–102.9)
NRBC AUTOMATED: 0 PER 100 WBC
PDW BLD-RTO: 11.8 % (ref 11.8–14.4)
PLATELET # BLD: 220 K/UL (ref 138–453)
PMV BLD AUTO: 10 FL (ref 8.1–13.5)
RBC # BLD: 3.77 M/UL (ref 3.95–5.11)
WBC # BLD: 10.4 K/UL (ref 3.5–11.3)

## 2022-03-28 PROCEDURE — 36415 COLL VENOUS BLD VENIPUNCTURE: CPT

## 2022-03-28 PROCEDURE — 82950 GLUCOSE TEST: CPT

## 2022-03-28 PROCEDURE — 85027 COMPLETE CBC AUTOMATED: CPT

## 2022-03-31 ENCOUNTER — APPOINTMENT (OUTPATIENT)
Dept: PHYSICAL THERAPY | Facility: CLINIC | Age: 27
End: 2022-03-31
Payer: COMMERCIAL

## 2022-04-05 ENCOUNTER — ROUTINE PRENATAL (OUTPATIENT)
Dept: OBGYN CLINIC | Age: 27
End: 2022-04-05
Payer: COMMERCIAL

## 2022-04-05 VITALS
SYSTOLIC BLOOD PRESSURE: 121 MMHG | HEART RATE: 87 BPM | WEIGHT: 152.8 LBS | DIASTOLIC BLOOD PRESSURE: 74 MMHG | BODY MASS INDEX: 27.95 KG/M2

## 2022-04-05 DIAGNOSIS — Z23 NEED FOR TDAP VACCINATION: ICD-10-CM

## 2022-04-05 DIAGNOSIS — Z34.92 PRENATAL CARE IN SECOND TRIMESTER: Primary | ICD-10-CM

## 2022-04-05 PROCEDURE — 90715 TDAP VACCINE 7 YRS/> IM: CPT | Performed by: OBSTETRICS & GYNECOLOGY

## 2022-04-05 PROCEDURE — 90471 IMMUNIZATION ADMIN: CPT | Performed by: OBSTETRICS & GYNECOLOGY

## 2022-04-05 PROCEDURE — 0502F SUBSEQUENT PRENATAL CARE: CPT | Performed by: OBSTETRICS & GYNECOLOGY

## 2022-04-05 NOTE — PROGRESS NOTES
Kerin Mortimer is a  @ 27w6d who presents for KAIN visit. She denies LOF, VB or Ctxs.  + FM. She denies any complaints. She denies any fevers/chills, SOB, cough, sore throat, loss of taste/smell or sick contacts. She denies any RUQ pain, HA or vision changes. O:  Vitals:    22 1445   BP: 121/74   Pulse: 87     Gen: NAD  Abd: soft, nontender, gravid  Ext:  no edema      BP: 121/74  Weight: 152 lb 12.8 oz (69.3 kg)  Pulse: 87  Patient Position: Sitting  Fundal Height (cm): 28 cm  Fetal Heart Rate: 135  Movement: Present    A/P:  Patient Active Problem List    Diagnosis Date Noted    COVID-19 vaccine series completed 2022     Booster 3/18      High risk for colon cancer 2022     Start at 36 and every 5 years per genetic testing      Increased risk of breast cancer 2022     Mammogram and MRI yearly at 40 per genetic testing note.  Pregnancy through Egg Donor 2022    Depression 2021    ASCUS with positive high risk HPV cervical 2021     ASCUS + other high risk HPV       Neck pain 2021    Thoracic back sprain 2021     Discussed updated COVID precautions and policies, including but not limited to outpatient testing 3-4 days prior to scheduled delivery or universal rapid screening on L&D for unscheduled delivery unless fully vaccinated. Reviewed updated visitor policy. Encouraged social distancing and appropriate hand washing/hygiene practices. Reviewed symptoms suspicious for COVID infection. Discussed that ACOG, SMFM, and the CDC recommend to not withold immunization in pregnant and breastfeeding women who meet criteria for receipt of the vaccine based on the ACIP recommended priority groups. All questions answered. Patient vocalized understanding.     Tdap today  Discussed s/sx that should prompt call to the office  Discussed kick counts  RTC in 2 wks    Kalli Prado MD

## 2022-04-05 NOTE — PROGRESS NOTES
After obtaining consent, and per orders of Dr. Raciel Chapman, injection of Tdap given in Left deltoid by Diann Sam. Patient instructed to remain in clinic for 20 minutes afterwards, and to report any adverse reaction to me immediately.

## 2022-04-19 ENCOUNTER — TELEPHONE (OUTPATIENT)
Dept: OBGYN CLINIC | Age: 27
End: 2022-04-19

## 2022-04-19 RX ORDER — ONDANSETRON 4 MG/1
4 TABLET, ORALLY DISINTEGRATING ORAL EVERY 8 HOURS PRN
Qty: 24 TABLET | Refills: 0 | Status: SHIPPED | OUTPATIENT
Start: 2022-04-19 | End: 2022-05-03 | Stop reason: ALTCHOICE

## 2022-04-19 NOTE — TELEPHONE ENCOUNTER
PT called she is 29 weeks pregnant she is wondering if she can have Zofran sent in to help with her nausea please advise

## 2022-05-03 ENCOUNTER — ROUTINE PRENATAL (OUTPATIENT)
Dept: OBGYN CLINIC | Age: 27
End: 2022-05-03

## 2022-05-03 VITALS
WEIGHT: 153 LBS | DIASTOLIC BLOOD PRESSURE: 70 MMHG | HEART RATE: 86 BPM | SYSTOLIC BLOOD PRESSURE: 116 MMHG | BODY MASS INDEX: 27.98 KG/M2

## 2022-05-03 DIAGNOSIS — Z34.93 PRENATAL CARE IN THIRD TRIMESTER: Primary | ICD-10-CM

## 2022-05-03 PROCEDURE — 0502F SUBSEQUENT PRENATAL CARE: CPT | Performed by: OBSTETRICS & GYNECOLOGY

## 2022-05-03 NOTE — PROGRESS NOTES
Heike Candelaria is a  @ 31w6d who presents for KAIN visit. She denies LOF, VB or Ctxs.  + FM. She denies any complaints . She denies any fevers/chills, SOB, cough, sore throat, loss of taste/smell or sick contacts.  She denies any HA, RUQ pain or vision changes.         O:  Vitals:    22 1502   BP: 116/70   Pulse: 86     Gen: NAD  Abd: soft, nontender, gravid  Ext:  no edema      BP: 116/70  Weight: 153 lb (69.4 kg)  Pulse: 86  Patient Position: Sitting  Fundal Height (cm): 31 cm  Fetal Heart Rate: 136  Movement: Present    A/P:  Patient Active Problem List    Diagnosis Date Noted    Need for Tdap vaccination 2022      COVID-19 vaccine series completed 2022     Booster 3/18      High risk for colon cancer 2022     Start at 36 and every 5 years per genetic testing      Increased risk of breast cancer 2022     Mammogram and MRI yearly at 40 per genetic testing note.  Pregnancy through Egg Donor 2022    Depression 2021    ASCUS with positive high risk HPV cervical 2021     ASCUS + other high risk HPV       Neck pain 2021    Thoracic back sprain 2021     Discussed updated COVID precautions and policies, including but not limited to outpatient testing 3-4 days prior to scheduled delivery or universal rapid screening on L&D for unscheduled delivery unless fully vaccinated. Reviewed updated visitor policy. Encouraged social distancing and appropriate hand washing/hygiene practices. Reviewed symptoms suspicious for COVID infection. Discussed that ACOG, SMFM, and the CDC recommend to not withold immunization in pregnant and breastfeeding women who meet criteria for receipt of the vaccine based on the ACIP recommended priority groups. All questions answered. Patient vocalized understanding.     Discussed s/sx that should prompt call to the office  Discussed kick counts  RTC in 2 wks    Dayna Brand MD

## 2022-05-12 ENCOUNTER — HOSPITAL ENCOUNTER (OUTPATIENT)
Dept: PHYSICAL THERAPY | Facility: CLINIC | Age: 27
Setting detail: THERAPIES SERIES
Discharge: HOME | End: 2022-05-12
Payer: COMMERCIAL

## 2022-05-12 PROCEDURE — 97530 THERAPEUTIC ACTIVITIES: CPT

## 2022-05-12 NOTE — FLOWSHEET NOTE
[] [facility]) - [facility] &  Therapy  [address]  [phone]  F: [phone] [x] [address]  [facility]   [address]  P: [phone]  F: [phone] [] [place]  [address]  P: [phone]  F: [phone] [] [address]  P: [phone]  F: [phone] [] [address]  [facility]   [address]   [place]: [phone]  F: [phone]      Physical Therapy Daily Treatment Note/ Progress Note     Date:  2022  Patient Name:  Be Villafana    :  1995  MRN: [id number]  Physician: Fatou DAILY- CNP                    Insurance: [employer](first) [employer] (second); 30 vs per year   Medical Diagnosis:   Z3A.21 (ICD-10-CM) - 21 weeks gestation of pregnancy   M54.41, G89.29, M54.42 (ICD-10-CM) - Chronic low back pain with bilateral sciatica, unspecified back pain laterality                                Rehab Codes: M54.49, M62.81, R29.3, R27.9  Onset Date: 10+ weeks ago, ~12/15/2021             Visit# / total visits:    Subjective:    Pain:  [] Yes  [x] No Location: low back  Pain Rating: (0-10 scale) 0/10  Pain altered Tx:  [x] No  [] Yes  Action:    Today, Heike Candelaria reports minimal back pain and has been doing well with the exercises. Progress made: elimination of pain   Persistent symptoms:none  New symptoms: none    Heike Candelaria reports \"feeling excellent and enjoyed the taping\" following last appointment, and endorses excellent compliance with home exercise program. Heike Candelaria no  adverse response to prescribed home exercises/activities. Plan for today's session was verbally explained to patient along with clinical rationale.  Concepción  verbalized agreement with today's plan prior to the initiation of treatment. She denies red flags related to pregnancy. Specifically, denies: change in vaginal discharge, vaginal bleeding or spotting, sudden discharge of mucus or gush of fluid, active contractions, headaches, dizziness, fainting. Objective:  Precuations: 33 weeks pregnant  Consent: Patient verbally consented to external palpation for pelvic floor with no red flags present 3/3/2022. Patient was appropriately draped and only areas that were being treated were exposed. Therapist provided detailed explanation of treatment prior to initiation of session. Patient verbalized and demonstrated understanding and provided verbal consent. Consent was checked and received prior to initiating different treatment techniques and checked frequently throughout session.      Modalities:   Exercises:  Exercise Reps/ Time Weight/ Level Comments    Birth Mechanics/Labor positioning  50 min                                                  Other:      Specific Instructions for next treatment: PRN, birth mechanics        Treatment Charges: Mins Units   []  Modalities     []  Ther Exercise     []  Manual Therapy     [x]  Ther Activities 50   3    []  Aquatics     []  Vasocompression     []  Other     Total Treatment time 50  3        Assessment: [x] Progressing toward goals. Performed birth mechanics training this date with pt demonstrated excellent pelvic floor and breathe coordination for push mechanics (open glottis pushing). Went through birth mechanics training with laboring positioning with and without epidural. Discussed perineal massage and stretching. [] No change. [] Other:  [x] Patient would continue to benefit from skilled physical therapy services in order to: pregnancy pain management, decreased coordination, birth prep and eliminate pelvic girdle pain.       STG (to be met in 6 treatments):              1. Pt to report less than 4/10 pain at worst for improved quality of life.  5/12/2022 MET               2. Pt to report radicular signs/symptoms to reduce to 30% for improved sleep. 5/12/2022 MET               3. Pt to exhibit WFL for AROM for lumbar spine, pain free for improved spinal mobility/function. 5/12/2022 MET               4. Pt to score 6% decrease on the [facility] functional outcome measure to indicate improved function and mobility. 5. Pt to report compliance with heel lift and decreased back pain related to use of heel lift in left shoe to aide in posture and stability. 5/12/2022 MET               6. Pt to wear abdominal brace or back support with no cues to improve pain. 7. Pt to demonstrate proper pelvic brace (transverse abdominis activation+ exhale  + pelvic floor contraction) as palpated by therapist to ensure lumbar stabilization with functional activities (lifting, sitting, etc). 5/12/2022 MET      LTG (to be met in 12 treatments):               1. Pt to report less than 3/10 pain at worst for improved quality of life. 5/12/2022 MET               2. Pt to report radicular signs/symptoms to reduce to 40% for improved sleep. 5/12/2022 MET               3.  Pt to demonstrate proper lifting mechanics with no cueing to reduce risk for injury with completion of ADLs. 4. Pt able to maintain pelvic vrace as palpated with therapist for 10 reps during standing marching to ensure lumbar stabilization with stair climbing. 5/12/2022 MET               5. Pt to demonstrate equalized pelvic obliquity via palpation by therapist to reduce abnormal forces through lumbar/SI joint. 5/12/2022 MET               6. Pt to score a 12% decrease on the Oswestry outcome measure to indicate improved function and decrease pain with ADL completion. Added 5/12/2022   1. Pt to demonstrate independence with home exercise program to maximize potential in therapy and to maintain functional gains/reduction in symptoms. 5/12/2022 MET   2.  Pt to demonstrate simultaneous activation of TrA and pelvic floor relaxation with diaphragmatic exhale as evidenced by sEMG to optimize pushing mechanics. 5/12/2022 MET   3. Pt to independently contract pelvic floor correctly with no cueing or accessory muscle use as observed or palpated by therapist to ensure proper technique for PFM cont' to optimize pelvic floor strength to reduce leakage post partum      Patient goals: to reduce back pain        Pt. Education:  [x] Yes  [] No  [x] Reviewed Prior HEP/Ed  Method of Education: [x] Verbal  [x] Demo  [x] Written- see chart   Comprehension of Education:  [] Verbalizes understanding. [x] Demonstrates understanding. [] Needs review. [x] Demonstrates/verbalizes HEP/Ed previously given. Plan: [x] current frequency toward long and short term goals remain the same. Pt is on PRN as pt's pain has resolved.      [x] Specific Instructions for subsequent treatments: PRN, birth mechanics   Time In: 630pm       Time Out: 200pm     Electronically signed by:  Crispin Giraldo PT      Crispin Giraldo PT, DPT   License # [id number]

## 2022-05-19 ENCOUNTER — ROUTINE PRENATAL (OUTPATIENT)
Dept: OBGYN CLINIC | Age: 27
End: 2022-05-19

## 2022-05-19 VITALS
WEIGHT: 159 LBS | HEART RATE: 80 BPM | SYSTOLIC BLOOD PRESSURE: 96 MMHG | HEIGHT: 62 IN | BODY MASS INDEX: 29.26 KG/M2 | DIASTOLIC BLOOD PRESSURE: 64 MMHG

## 2022-05-19 DIAGNOSIS — Z3A.34 34 WEEKS GESTATION OF PREGNANCY: Primary | ICD-10-CM

## 2022-05-19 DIAGNOSIS — O09.93 SUPERVISION OF HIGH RISK PREGNANCY IN THIRD TRIMESTER: ICD-10-CM

## 2022-05-19 PROCEDURE — G8419 CALC BMI OUT NRM PARAM NOF/U: HCPCS | Performed by: STUDENT IN AN ORGANIZED HEALTH CARE EDUCATION/TRAINING PROGRAM

## 2022-05-19 PROCEDURE — G8427 DOCREV CUR MEDS BY ELIG CLIN: HCPCS | Performed by: STUDENT IN AN ORGANIZED HEALTH CARE EDUCATION/TRAINING PROGRAM

## 2022-05-19 PROCEDURE — 0502F SUBSEQUENT PRENATAL CARE: CPT | Performed by: STUDENT IN AN ORGANIZED HEALTH CARE EDUCATION/TRAINING PROGRAM

## 2022-05-19 PROCEDURE — 1036F TOBACCO NON-USER: CPT | Performed by: STUDENT IN AN ORGANIZED HEALTH CARE EDUCATION/TRAINING PROGRAM

## 2022-05-19 NOTE — PROGRESS NOTES
Prenatal Visit    Jennyfer Solitario is a 32 y.o. female  at 34w1d    The patient was seen and evaluated. Reports positive fetal movements. She denies headache, vision changes, RUQ pain, contractions, vaginal bleeding and leakage of fluid. The patient was instructed on fetal kick counts and was given a kick sheet to complete every 8 hours. She was instructed that the baby should move at a minimum of ten times within one hour after a meal. The patient was instructed to lay down on her left side twenty minutes after eating and count movements for up to one hour with a target value of ten movements. She was instructed to notify the office if she did not make that target after two attempts or if after any attempt there was less than four movements. The patient admits to that the targets have been made. The patient already received the T-Dap Vaccine (27-36 weeks) this pregnancy. The patient already received the influenza vaccine this year. The problem list reflects the active issues addressed during today's visit    Vitals:    BP: 96/64  Weight: 159 lb (72.1 kg)  Pulse: 80  Fundal Height (cm): 34 cm  Fetal Heart Rate: 142     28 Week Labs: The patient is RH +, Rhogam not indicated  ABO/Rh   Date Value Ref Range Status   2021 B POSITIVE  Final       1hr GTT: 109   28 week CBC:   Lab Results   Component Value Date    WBC 10.4 2022    HGB 11.2 (L) 2022    HCT 34.5 (L) 2022    MCV 91.5 2022     2022     UA w/ Ur C&S: neg     Assessment & Plan:  Jennyfer Solitario is a 32 y.o. female  at 34w1d   - 28 week labs completed   - discussed recommendations for TDAP immunization, patient already received TDAP.    - Previously COVID vaccinated     Patient Active Problem List    Diagnosis Date Noted    Need for Tdap vaccination 2022      COVID-19 vaccine series completed 2022     Booster 3/18      High risk for colon cancer 2022     Start at 36 and every 5 years per genetic testing      Increased risk of breast cancer 02/07/2022     Mammogram and MRI yearly at 40 per genetic testing note.  Pregnancy through Egg Donor 01/26/2022    Depression 11/30/2021    ASCUS with positive high risk HPV cervical 08/24/2021     ASCUS + other high risk HPV 2021      Neck pain 06/24/2021    Thoracic back sprain 06/24/2021     Return in about 2 weeks (around 6/2/2022) for LOCO Romero, [address] [facility]   5/19/2022, 3:47 PM

## 2022-06-03 ENCOUNTER — HOSPITAL ENCOUNTER (OUTPATIENT)
Age: 27
Setting detail: SPECIMEN
Discharge: HOME | End: 2022-06-03

## 2022-06-03 ENCOUNTER — ROUTINE PRENATAL (OUTPATIENT)
Dept: OBGYN CLINIC | Age: 27
End: 2022-06-03

## 2022-06-03 VITALS — HEART RATE: 83 BPM | WEIGHT: 161.38 LBS | BODY MASS INDEX: 29.52 KG/M2

## 2022-06-03 DIAGNOSIS — Z34.93 PRENATAL CARE, THIRD TRIMESTER: Primary | ICD-10-CM

## 2022-06-03 DIAGNOSIS — Z34.93 PRENATAL CARE, THIRD TRIMESTER: ICD-10-CM

## 2022-06-03 PROCEDURE — 0502F SUBSEQUENT PRENATAL CARE: CPT | Performed by: OBSTETRICS & GYNECOLOGY

## 2022-06-03 NOTE — PROGRESS NOTES
Rory Luna is a  @ 36w2d who presents for KAIN visit. She denies LOF, VB or Ctxs.  + FM. She is having issues with carpal tunnel and is exhausted. She denies any fevers/chills, SOB, cough, sore throat, loss of taste/smell or sick contacts. Pt denies any HA, vision changes or RUQ pain. O:  Vitals:    22 1103   Pulse: 83     Gen: NAD  Abd: soft, nontender, gravid  Ext:  no edema      Weight: 161 lb 6 oz (73.2 kg)  Heart Rate: 83  Patient Position: Sitting  Fundal Height (cm): 36 cm  Fetal Heart Rate: 150  Movement: Present    A/P:  Patient Active Problem List    Diagnosis Date Noted    Need for Tdap vaccination 2022      COVID-19 vaccine series completed 2022     Booster 3/18      High risk for colon cancer 2022     Start at 36 and every 5 years per genetic testing      Increased risk of breast cancer 2022     Mammogram and MRI yearly at 40 per genetic testing note.  Pregnancy through Sperm donor 2022    Depression 2021    ASCUS with positive high risk HPV cervical 2021     ASCUS + other high risk HPV       Neck pain 2021    Thoracic back sprain 2021     Discussed updated COVID precautions and policies, including but not limited to outpatient testing 3-4 days prior to scheduled delivery or universal rapid screening on L&D for unscheduled delivery unless fully vaccinated. Reviewed updated visitor policy. Encouraged social distancing and appropriate hand washing/hygiene practices. Reviewed symptoms suspicious for COVID infection. Discussed that ACOG, SMFM, and the CDC recommend to not withold immunization in pregnant and breastfeeding women who meet criteria for receipt of the vaccine based on the ACIP recommended priority groups. All questions answered. Patient vocalized understanding.     GBS today  Discussed s/sx that should prompt call to the office  Discussed kick stacie  RTC in 1 wks    Christine Sanchez MD

## 2022-06-06 LAB
CULTURE: NORMAL
SPECIMEN DESCRIPTION: NORMAL

## 2022-06-10 ENCOUNTER — ROUTINE PRENATAL (OUTPATIENT)
Dept: OBGYN CLINIC | Age: 27
End: 2022-06-10

## 2022-06-10 VITALS — DIASTOLIC BLOOD PRESSURE: 80 MMHG | BODY MASS INDEX: 29.41 KG/M2 | SYSTOLIC BLOOD PRESSURE: 102 MMHG | WEIGHT: 160.8 LBS

## 2022-06-10 DIAGNOSIS — Z52.819: ICD-10-CM

## 2022-06-10 DIAGNOSIS — Z3A.37 37 WEEKS GESTATION OF PREGNANCY: Primary | ICD-10-CM

## 2022-06-10 DIAGNOSIS — O09.93 SUPERVISION OF HIGH RISK PREGNANCY IN THIRD TRIMESTER: ICD-10-CM

## 2022-06-10 PROCEDURE — 0502F SUBSEQUENT PRENATAL CARE: CPT | Performed by: NURSE PRACTITIONER

## 2022-06-10 PROCEDURE — G8427 DOCREV CUR MEDS BY ELIG CLIN: HCPCS | Performed by: NURSE PRACTITIONER

## 2022-06-10 PROCEDURE — G8419 CALC BMI OUT NRM PARAM NOF/U: HCPCS | Performed by: NURSE PRACTITIONER

## 2022-06-10 PROCEDURE — 1036F TOBACCO NON-USER: CPT | Performed by: NURSE PRACTITIONER

## 2022-06-10 NOTE — PROGRESS NOTES
+FM, -Ctx, -LOF, -VB  Patient Active Problem List   Diagnosis    Neck pain    Thoracic back sprain    ASCUS with positive high risk HPV cervical    Depression    Pregnancy through Sperm donor    High risk for colon cancer    Increased risk of breast cancer    COVID-19 vaccine series completed    Need for Tdap vaccination     Blood pressure 102/80, weight 160 lb 12.8 oz (72.9 kg), last menstrual period 09/22/2021.   Reviewed kick counts, labor and pre eclampsia precautions  Feeling well  Good FM  Will contact ins to check on breast pump

## 2022-06-10 NOTE — PATIENT INSTRUCTIONS
Patient Education        Week 40 of Your Pregnancy: Care Instructions  Overview     You are near the end of your pregnancy--and you're probably pretty uncomfortable. It may be harder to walk around. Lying down probably isn'tcomfortable either. You may have trouble getting to sleep or staying asleep. Most babies are born between 40 and 41 weeks. This is a good time to think about packing a bag for the hospital with items you'll need. Then you'll beready when labor starts. Follow-up care is a key part of your treatment and safety. Be sure to make and go to all appointments, and call your doctor if you are having problems. It's also a good idea to know your test results and keep alist of the medicines you take. How can you care for yourself at home? Learn about breastfeeding   Breastfeeding is best for your baby and good for you.  Breast milk has antibodies to help your baby fight infections.  If you breastfeed, you may lose weight faster. That's because making milk burns calories.  Learning the best ways to hold your baby will make breastfeeding easier.  Sometimes breastfeeding can make partners feel left out. If you have a partner, plan how you can care for your baby together. For example, your partner can bathe and diaper the baby. You can snuggle together when you breastfeed.  You may want to learn how to use a breast pump and store your milk.  If you choose to bottle feed, make the feeding feel like breastfeeding so you can bond with your baby. Always hold your baby and the bottle. Don't prop bottles or let your baby fall asleep with a bottle. Learn about crying   It's common for babies to cry for 1 to 3 hours a day. Some cry more, and some cry less.  Babies don't cry to make you upset or because you're a bad parent.  Crying is how your baby communicates. Your baby may be hungry; have gas; need a diaper change; or feel cold, warm, tired, lonely, or tense.  Sometimes babies cry for unknown reasons.  If you respond to your baby's needs, your baby will learn to trust you.  Try to stay calm when your baby cries. Your baby may get more upset if they sense that you are upset. Know how to care for your    Your baby's umbilical cord stump will drop off on its own, usually between 1 and 2 weeks. To care for your baby's umbilical cord area:  ? Clean the area at the bottom of the cord 2 or 3 times a day. ? Pay special attention to the area where the cord attaches to the skin. ? Keep the diaper folded below the cord. ? Use a damp washcloth or cotton ball to sponge bathe your baby until the stump has come off.  Your baby's first dark stool is called meconium. After the meconium is passed, your baby will develop their own bowel pattern. ? Some babies, especially  babies, have several bowel movements a day. Others have one or two a day, or one every 2 to 3 days. ?  babies often have loose, yellow stools. Formula-fed babies have more formed stools. ? If your baby's stools look like little pellets, your baby is constipated. After 2 days of constipation, call your baby's doctor.  If your baby will be circumcised, you can care for your baby at home. ? Gently rinse your baby's penis with warm water after every diaper change. Don't try to remove the film that forms on the penis. This film will go away on its own. Pat dry. ? Put petroleum ointment, such as Vaseline, on the area of the diaper that will touch your baby's penis. This will keep the diaper from sticking to your baby. ? Ask the doctor about giving your baby acetaminophen (Tylenol) for pain. Where can you learn more? Go to https://chpebrentewgladys.[employer]. org and sign in to your [employer] account. Enter 48  43 in the "[employer] box to learn more about \"Week 37 of Your Pregnancy: Care Instructions. \"     If you do not have an account, please click on the \"Sign Up Now\" link.   Current as of:  2021               Content Version: 13.2  © 2006-2022 [employer]. Care instructions adapted under license by [employer]. If you have questions about a medical condition or this instruction, always ask your healthcare professional. [address] any warranty or liability for your use of this information. Patient Education        Counting Your Baby's Kicks: Care Instructions  Overview     Counting your baby's kicks is one way your doctor can tell that your baby is healthy. Most women--especially in a first pregnancy--feel their baby move for the first time between 16 and 22 weeks. The movement may feel like flutters rather than kicks. Your baby may move more at certain times of the day. When you are active, you may notice less kicking than when you are resting. At yourprenatal visits, your doctor will ask whether the baby is active. In your last trimester, your doctor may ask you to count the number of timesyou feel your baby move. Follow-up care is a key part of your treatment and safety. Be sure to make and go to all appointments, and call your doctor if you are having problems. It's also a good idea to know your test results and keep alist of the medicines you take. How do you count fetal kicks?  A common method of checking your baby's movement is to note the length of time it takes to count ten movements (such as kicks, flutters, or rolls).  Pick your baby's most active time of day to count. This may be any time from morning to evening.  If you don't feel 10 movements in an hour, have something to eat or drink and count for another hour. If you don't feel at least 10 movements in the 2-hour period, call your doctor. When should you call for help?    Call your doctor now or seek immediate medical care if:     You noticed that your baby has stopped moving or is moving much less than normal.   Watch closely for changes in your health, and be sure to contact your opal.)   You think that you may be in labor. This means that you've had at least 6 contractions in an hour.  You notice that your baby has stopped moving or is moving much less than normal.   You have symptoms of a urinary tract infection. These may include:  ? Pain or burning when you urinate. ? A frequent need to urinate without being able to pass much urine. ? Pain in the flank, which is just below the rib cage and above the waist on either side of the back. ? Blood in your urine. Watch closely for changes in your health, and be sure to contact your doctor if:   You have vaginal discharge that smells bad.  You have skin changes, such as:  ? A rash. ? Itching. ? Yellow color to your skin.  You have other concerns about your pregnancy. If you have labor signs at 37 weeks or more  If you have signs of labor at 37 weeks or more, your doctor may tell you tocall when your labor becomes more active. Symptoms of active labor include:   Contractions that are regular.  Contractions that are less than 5 minutes apart.  Contractions that are hard to talk through. Follow-up care is a key part of your treatment and safety. Be sure to make and go to all appointments, and call your doctor if you are having problems. It's also a good idea to know your test results and keep alist of the medicines you take. Where can you learn more? Go to https://alex.[employer]. org and sign in to your [employer] account. Enter  in the [facility] box to learn more about \"Learning About When to Call Your Doctor During Pregnancy (After 20 Weeks). \"     If you do not have an account, please click on the \"Sign Up Now\" link. Current as of: June 16, 2021               Content Version: 13.2  © 8096-5023 [employer]. Care instructions adapted under license by [facility] ([facility]).  If you have questions about a medical condition or this instruction, always ask your healthcare professional. [employer] disclaims any warranty or liability for your use of this information.

## 2022-06-17 ENCOUNTER — ROUTINE PRENATAL (OUTPATIENT)
Dept: OBGYN CLINIC | Age: 27
End: 2022-06-17

## 2022-06-17 VITALS
WEIGHT: 162.8 LBS | BODY MASS INDEX: 29.78 KG/M2 | HEART RATE: 96 BPM | DIASTOLIC BLOOD PRESSURE: 79 MMHG | SYSTOLIC BLOOD PRESSURE: 116 MMHG

## 2022-06-17 DIAGNOSIS — Z34.93 PRENATAL CARE IN THIRD TRIMESTER: Primary | ICD-10-CM

## 2022-06-17 PROCEDURE — 0502F SUBSEQUENT PRENATAL CARE: CPT | Performed by: OBSTETRICS & GYNECOLOGY

## 2022-06-17 NOTE — PROGRESS NOTES
Alexis Martinez is a  @ 38w2d who presents for KAIN visit. She denies LOF, VB.  + FM. She is having some ctxs, but nothing frequent or regular. She denies any fevers/chills, SOB, cough, sore throat, loss of taste/smell or sick contacts. Pt denies any HA, vision changes or RUQ pain. O:  Vitals:    22 0934   BP: 116/79   Pulse: 96     Gen: NAD  Abd: soft, nontender, gravid  Ext:  no edema      BP: 116/79  Weight: 162 lb 12.8 oz (73.8 kg)  Heart Rate: 96  Patient Position: Sitting  Fundal Height (cm): 37 cm  Fetal Heart Rate: 150  Movement: Present    A/P:  Patient Active Problem List    Diagnosis Date Noted    Need for Tdap vaccination 2022      COVID-19 vaccine series completed 2022     Booster 3/18      High risk for colon cancer 2022     Start at 36 and every 5 years per genetic testing      Increased risk of breast cancer 2022     Mammogram and MRI yearly at 40 per genetic testing note.  Pregnancy through Sperm donor 2022    Depression 2021    ASCUS with positive high risk HPV cervical 2021     ASCUS + other high risk HPV       Neck pain 2021    Thoracic back sprain 2021     Discussed updated COVID precautions and policies. Reviewed updated visitor policy. Encouraged social distancing and appropriate hand washing/hygiene practices. Reviewed symptoms suspicious for COVID infection. Discussed that ACOG, SMFM, and the CDC recommend to not withold immunization in pregnant and breastfeeding women who meet criteria for receipt of the vaccine based on the ACIP recommended priority groups. All questions answered. Patient vocalized understanding.     Discussed s/sx that should prompt call to the office  Discussed ley quinones  RTC in 1 wks    Jesse Rasheed MD

## 2022-06-23 NOTE — PROGRESS NOTES
Ana Espinal is a  @ 39w2d who presents for KAIN visit. She denies LOF, VB or Ctxs.  + FM. She is still hoping to go into labor on her own. She is not having any increase in ctxs at this time. She denies any fevers/chills, SOB, cough, sore throat, loss of taste/smell or sick contacts. Pt denies any HA, vision changes or RUQ pain. O:  Vitals:    22 0955   BP: 115/71   Pulse: 87     Gen: NAD  Abd: soft, nontender, gravid  Ext:  no edema      BP: 115/71  Weight: 164 lb 9.6 oz (74.7 kg)  Heart Rate: 87  Patient Position: Sitting  Fundal Height (cm): 38 cm  Fetal Heart Rate: 155  Movement: Present    A/P:  Patient Active Problem List    Diagnosis Date Noted    Need for Tdap vaccination 2022      COVID-19 vaccine series completed 2022     Booster 3/18      High risk for colon cancer 2022     Start at 36 and every 5 years per genetic testing      Increased risk of breast cancer 2022     Mammogram and MRI yearly at 40 per genetic testing note.  Pregnancy through Sperm donor 2022    Depression 2021    ASCUS with positive high risk HPV cervical 2021     ASCUS + other high risk HPV       Neck pain 2021    Thoracic back sprain 2021     Discussed updated COVID precautions and policies. Reviewed updated visitor policy. Encouraged social distancing and appropriate hand washing/hygiene practices. Reviewed symptoms suspicious for COVID infection. Discussed that ACOG, SMFM, and the CDC recommend to not withold immunization in pregnant and breastfeeding women who meet criteria for receipt of the vaccine based on the ACIP recommended priority groups. All questions answered. Patient vocalized understanding.     Discussed s/sx that should prompt call to the office  Discussed kick stacie  RTC in 1 wks    Hazel Lopez MD

## 2022-06-24 ENCOUNTER — ROUTINE PRENATAL (OUTPATIENT)
Dept: OBGYN CLINIC | Age: 27
End: 2022-06-24

## 2022-06-24 VITALS
WEIGHT: 164.6 LBS | SYSTOLIC BLOOD PRESSURE: 115 MMHG | HEART RATE: 87 BPM | DIASTOLIC BLOOD PRESSURE: 71 MMHG | BODY MASS INDEX: 30.11 KG/M2

## 2022-06-24 DIAGNOSIS — Z34.93 PRENATAL CARE IN THIRD TRIMESTER: Primary | ICD-10-CM

## 2022-06-24 PROCEDURE — 0502F SUBSEQUENT PRENATAL CARE: CPT | Performed by: OBSTETRICS & GYNECOLOGY

## 2022-06-28 ENCOUNTER — ANESTHESIA (OUTPATIENT)
Dept: LABOR AND DELIVERY | Age: 27
End: 2022-06-28
Payer: COMMERCIAL

## 2022-06-28 ENCOUNTER — HOSPITAL ENCOUNTER (INPATIENT)
Age: 27
LOS: 4 days | Discharge: HOME | End: 2022-07-02
Attending: OBSTETRICS & GYNECOLOGY | Admitting: OBSTETRICS & GYNECOLOGY
Payer: COMMERCIAL

## 2022-06-28 ENCOUNTER — ANESTHESIA EVENT (OUTPATIENT)
Dept: LABOR AND DELIVERY | Age: 27
End: 2022-06-28
Payer: COMMERCIAL

## 2022-06-28 DIAGNOSIS — Z98.890 POST-OPERATIVE STATE: Primary | ICD-10-CM

## 2022-06-28 PROBLEM — Z3A.39 39 WEEKS GESTATION OF PREGNANCY: Status: ACTIVE | Noted: 2022-06-28

## 2022-06-28 PROBLEM — M54.2 NECK PAIN: Status: RESOLVED | Noted: 2021-06-24 | Resolved: 2022-06-28

## 2022-06-28 PROBLEM — Z98.891 S/P PRIMARY LOW TRANSVERSE C-SECTION: Status: ACTIVE | Noted: 2022-06-28

## 2022-06-28 PROBLEM — Z3A.38 38 WEEKS GESTATION OF PREGNANCY: Status: ACTIVE | Noted: 2022-06-28

## 2022-06-28 PROBLEM — Z3A.38 38 WEEKS GESTATION OF PREGNANCY: Status: RESOLVED | Noted: 2022-06-28 | Resolved: 2022-06-28

## 2022-06-28 LAB
-: ABNORMAL
ABO/RH: NORMAL
ABSOLUTE EOS #: 0.04 K/UL (ref 0–0.44)
ABSOLUTE IMMATURE GRANULOCYTE: 0.07 K/UL (ref 0–0.3)
ABSOLUTE LYMPH #: 1.49 K/UL (ref 1.1–3.7)
ABSOLUTE MONO #: 0.65 K/UL (ref 0.1–1.2)
AMPHETAMINE SCREEN URINE: NEGATIVE
ANTIBODY SCREEN: NEGATIVE
ARM BAND NUMBER: NORMAL
BARBITURATE SCREEN URINE: NEGATIVE
BASOPHILS # BLD: 0 % (ref 0–2)
BASOPHILS ABSOLUTE: <0.03 K/UL (ref 0–0.2)
BENZODIAZEPINE SCREEN, URINE: NEGATIVE
BILIRUBIN URINE: NEGATIVE
CANDIDA SPECIES, DNA PROBE: NEGATIVE
CANNABINOID SCREEN URINE: NEGATIVE
CASTS UA: ABNORMAL /LPF (ref 0–8)
COCAINE METABOLITE, URINE: NEGATIVE
COLOR: YELLOW
EOSINOPHILS RELATIVE PERCENT: 0 % (ref 1–4)
EPITHELIAL CELLS UA: ABNORMAL /HPF (ref 0–5)
EXPIRATION DATE: NORMAL
GARDNERELLA VAGINALIS, DNA PROBE: NEGATIVE
GLUCOSE URINE: NEGATIVE
HCT VFR BLD CALC: 33.5 % (ref 36.3–47.1)
HEMOGLOBIN: 10.6 G/DL (ref 11.9–15.1)
IMMATURE GRANULOCYTES: 1 %
KETONES, URINE: NEGATIVE
LEUKOCYTE ESTERASE, URINE: ABNORMAL
LYMPHOCYTES # BLD: 13 % (ref 24–43)
MCH RBC QN AUTO: 24.7 PG (ref 25.2–33.5)
MCHC RBC AUTO-ENTMCNC: 31.6 G/DL (ref 28.4–34.8)
MCV RBC AUTO: 77.9 FL (ref 82.6–102.9)
METHADONE SCREEN, URINE: NEGATIVE
MONOCYTES # BLD: 6 % (ref 3–12)
NITRITE, URINE: NEGATIVE
NRBC AUTOMATED: 0 PER 100 WBC
OPIATES, URINE: NEGATIVE
OXYCODONE SCREEN URINE: NEGATIVE
PDW BLD-RTO: 14.7 % (ref 11.8–14.4)
PH UA: 6 (ref 5–8)
PHENCYCLIDINE, URINE: NEGATIVE
PLATELET # BLD: 239 K/UL (ref 138–453)
PMV BLD AUTO: 10.4 FL (ref 8.1–13.5)
PROTEIN UA: ABNORMAL
RBC # BLD: 4.3 M/UL (ref 3.95–5.11)
RBC # BLD: ABNORMAL 10*6/UL
RBC UA: ABNORMAL /HPF (ref 0–4)
SEG NEUTROPHILS: 80 % (ref 36–65)
SEGMENTED NEUTROPHILS ABSOLUTE COUNT: 9.42 K/UL (ref 1.5–8.1)
SOURCE: NORMAL
SPECIFIC GRAVITY UA: 1.02 (ref 1–1.03)
T. PALLIDUM, IGG: NONREACTIVE
TEST INFORMATION: NORMAL
TRICHOMONAS VAGINALIS DNA: NEGATIVE
TURBIDITY: CLEAR
URINE HGB: ABNORMAL
UROBILINOGEN, URINE: NORMAL
WBC # BLD: 11.7 K/UL (ref 3.5–11.3)
WBC UA: ABNORMAL /HPF (ref 0–5)

## 2022-06-28 PROCEDURE — 86901 BLOOD TYPING SEROLOGIC RH(D): CPT

## 2022-06-28 PROCEDURE — 81001 URINALYSIS AUTO W/SCOPE: CPT

## 2022-06-28 PROCEDURE — 2500000003 HC RX 250 WO HCPCS: Performed by: ANESTHESIOLOGY

## 2022-06-28 PROCEDURE — 2500000003 HC RX 250 WO HCPCS: Performed by: NURSE ANESTHETIST, CERTIFIED REGISTERED

## 2022-06-28 PROCEDURE — 2500000003 HC RX 250 WO HCPCS: Performed by: STUDENT IN AN ORGANIZED HEALTH CARE EDUCATION/TRAINING PROGRAM

## 2022-06-28 PROCEDURE — 6360000002 HC RX W HCPCS: Performed by: ANESTHESIOLOGY

## 2022-06-28 PROCEDURE — 51701 INSERT BLADDER CATHETER: CPT

## 2022-06-28 PROCEDURE — 87480 CANDIDA DNA DIR PROBE: CPT

## 2022-06-28 PROCEDURE — 87510 GARDNER VAG DNA DIR PROBE: CPT

## 2022-06-28 PROCEDURE — 3700000001 HC ADD 15 MINUTES (ANESTHESIA): Performed by: OBSTETRICS & GYNECOLOGY

## 2022-06-28 PROCEDURE — 2709999900 HC NON-CHARGEABLE SUPPLY: Performed by: OBSTETRICS & GYNECOLOGY

## 2022-06-28 PROCEDURE — 6360000002 HC RX W HCPCS: Performed by: STUDENT IN AN ORGANIZED HEALTH CARE EDUCATION/TRAINING PROGRAM

## 2022-06-28 PROCEDURE — 87086 URINE CULTURE/COLONY COUNT: CPT

## 2022-06-28 PROCEDURE — 96374 THER/PROPH/DIAG INJ IV PUSH: CPT

## 2022-06-28 PROCEDURE — 87660 TRICHOMONAS VAGIN DIR PROBE: CPT

## 2022-06-28 PROCEDURE — 86780 TREPONEMA PALLIDUM: CPT

## 2022-06-28 PROCEDURE — 3700000000 HC ANESTHESIA ATTENDED CARE: Performed by: OBSTETRICS & GYNECOLOGY

## 2022-06-28 PROCEDURE — 6370000000 HC RX 637 (ALT 250 FOR IP)

## 2022-06-28 PROCEDURE — 2580000003 HC RX 258

## 2022-06-28 PROCEDURE — 86850 RBC ANTIBODY SCREEN: CPT

## 2022-06-28 PROCEDURE — 6360000002 HC RX W HCPCS: Performed by: NURSE ANESTHETIST, CERTIFIED REGISTERED

## 2022-06-28 PROCEDURE — 2580000003 HC RX 258: Performed by: NURSE ANESTHETIST, CERTIFIED REGISTERED

## 2022-06-28 PROCEDURE — 6360000002 HC RX W HCPCS

## 2022-06-28 PROCEDURE — 86900 BLOOD TYPING SEROLOGIC ABO: CPT

## 2022-06-28 PROCEDURE — 85025 COMPLETE CBC W/AUTO DIFF WBC: CPT

## 2022-06-28 PROCEDURE — 3E033VJ INTRODUCTION OF OTHER HORMONE INTO PERIPHERAL VEIN, PERCUTANEOUS APPROACH: ICD-10-PCS | Performed by: STUDENT IN AN ORGANIZED HEALTH CARE EDUCATION/TRAINING PROGRAM

## 2022-06-28 PROCEDURE — 2580000003 HC RX 258: Performed by: STUDENT IN AN ORGANIZED HEALTH CARE EDUCATION/TRAINING PROGRAM

## 2022-06-28 PROCEDURE — 1220000000 HC SEMI PRIVATE OB R&B

## 2022-06-28 PROCEDURE — 59510 CESAREAN DELIVERY: CPT | Performed by: OBSTETRICS & GYNECOLOGY

## 2022-06-28 PROCEDURE — 3700000025 EPIDURAL BLOCK: Performed by: ANESTHESIOLOGY

## 2022-06-28 PROCEDURE — A4216 STERILE WATER/SALINE, 10 ML: HCPCS | Performed by: STUDENT IN AN ORGANIZED HEALTH CARE EDUCATION/TRAINING PROGRAM

## 2022-06-28 PROCEDURE — 80307 DRUG TEST PRSMV CHEM ANLYZR: CPT

## 2022-06-28 PROCEDURE — 7100000000 HC PACU RECOVERY - FIRST 15 MIN: Performed by: OBSTETRICS & GYNECOLOGY

## 2022-06-28 PROCEDURE — 7100000001 HC PACU RECOVERY - ADDTL 15 MIN: Performed by: OBSTETRICS & GYNECOLOGY

## 2022-06-28 PROCEDURE — 6370000000 HC RX 637 (ALT 250 FOR IP): Performed by: STUDENT IN AN ORGANIZED HEALTH CARE EDUCATION/TRAINING PROGRAM

## 2022-06-28 PROCEDURE — 3609079900 HC CESAREAN SECTION: Performed by: OBSTETRICS & GYNECOLOGY

## 2022-06-28 PROCEDURE — 96375 TX/PRO/DX INJ NEW DRUG ADDON: CPT

## 2022-06-28 RX ORDER — DIPHENHYDRAMINE HYDROCHLORIDE 50 MG/ML
25 INJECTION INTRAMUSCULAR; INTRAVENOUS EVERY 6 HOURS PRN
Status: DISCONTINUED | OUTPATIENT
Start: 2022-06-28 | End: 2022-07-02 | Stop reason: HOSPADM

## 2022-06-28 RX ORDER — NALOXONE HYDROCHLORIDE 0.4 MG/ML
0.4 INJECTION, SOLUTION INTRAMUSCULAR; INTRAVENOUS; SUBCUTANEOUS PRN
Status: DISCONTINUED | OUTPATIENT
Start: 2022-06-28 | End: 2022-07-02 | Stop reason: HOSPADM

## 2022-06-28 RX ORDER — SODIUM CHLORIDE 0.9 % (FLUSH) 0.9 %
5-40 SYRINGE (ML) INJECTION PRN
Status: DISCONTINUED | OUTPATIENT
Start: 2022-06-28 | End: 2022-07-02 | Stop reason: HOSPADM

## 2022-06-28 RX ORDER — ACETAMINOPHEN 500 MG
1000 TABLET ORAL EVERY 6 HOURS PRN
Status: DISCONTINUED | OUTPATIENT
Start: 2022-06-28 | End: 2022-06-28

## 2022-06-28 RX ORDER — ACETAMINOPHEN 500 MG
1000 TABLET ORAL ONCE
Status: COMPLETED | OUTPATIENT
Start: 2022-06-28 | End: 2022-06-28

## 2022-06-28 RX ORDER — SODIUM CHLORIDE 0.9 % (FLUSH) 0.9 %
5-40 SYRINGE (ML) INJECTION EVERY 12 HOURS SCHEDULED
Status: DISCONTINUED | OUTPATIENT
Start: 2022-06-28 | End: 2022-07-02 | Stop reason: HOSPADM

## 2022-06-28 RX ORDER — ROPIVACAINE HYDROCHLORIDE 2 MG/ML
INJECTION, SOLUTION EPIDURAL; INFILTRATION; PERINEURAL
Status: COMPLETED
Start: 2022-06-28 | End: 2022-06-28

## 2022-06-28 RX ORDER — LANOLIN 72 %
OINTMENT (GRAM) TOPICAL
Status: DISCONTINUED | OUTPATIENT
Start: 2022-06-28 | End: 2022-07-02 | Stop reason: HOSPADM

## 2022-06-28 RX ORDER — SODIUM CHLORIDE 0.9 % (FLUSH) 0.9 %
5-40 SYRINGE (ML) INJECTION EVERY 12 HOURS SCHEDULED
Status: DISCONTINUED | OUTPATIENT
Start: 2022-06-28 | End: 2022-06-28

## 2022-06-28 RX ORDER — KETOROLAC TROMETHAMINE 30 MG/ML
30 INJECTION, SOLUTION INTRAMUSCULAR; INTRAVENOUS EVERY 6 HOURS
Status: DISCONTINUED | OUTPATIENT
Start: 2022-06-28 | End: 2022-06-28

## 2022-06-28 RX ORDER — LIDOCAINE HYDROCHLORIDE AND EPINEPHRINE 15; 5 MG/ML; UG/ML
INJECTION, SOLUTION EPIDURAL PRN
Status: DISCONTINUED | OUTPATIENT
Start: 2022-06-28 | End: 2022-06-28 | Stop reason: SDUPTHER

## 2022-06-28 RX ORDER — OXYCODONE HYDROCHLORIDE 5 MG/1
10 TABLET ORAL EVERY 4 HOURS PRN
Status: DISCONTINUED | OUTPATIENT
Start: 2022-06-29 | End: 2022-07-02 | Stop reason: HOSPADM

## 2022-06-28 RX ORDER — BISACODYL 10 MG
10 SUPPOSITORY, RECTAL RECTAL DAILY PRN
Status: DISCONTINUED | OUTPATIENT
Start: 2022-06-28 | End: 2022-07-02 | Stop reason: HOSPADM

## 2022-06-28 RX ORDER — DEXAMETHASONE SODIUM PHOSPHATE 10 MG/ML
INJECTION INTRAMUSCULAR; INTRAVENOUS PRN
Status: DISCONTINUED | OUTPATIENT
Start: 2022-06-28 | End: 2022-06-28 | Stop reason: SDUPTHER

## 2022-06-28 RX ORDER — NALOXONE HYDROCHLORIDE 0.4 MG/ML
INJECTION, SOLUTION INTRAMUSCULAR; INTRAVENOUS; SUBCUTANEOUS PRN
Status: DISCONTINUED | OUTPATIENT
Start: 2022-06-28 | End: 2022-06-28

## 2022-06-28 RX ORDER — SODIUM CHLORIDE, SODIUM LACTATE, POTASSIUM CHLORIDE, AND CALCIUM CHLORIDE .6; .31; .03; .02 G/100ML; G/100ML; G/100ML; G/100ML
1000 INJECTION, SOLUTION INTRAVENOUS PRN
Status: DISCONTINUED | OUTPATIENT
Start: 2022-06-28 | End: 2022-06-28

## 2022-06-28 RX ORDER — ACETAMINOPHEN 500 MG
1000 TABLET ORAL EVERY 8 HOURS SCHEDULED
Status: DISCONTINUED | OUTPATIENT
Start: 2022-06-28 | End: 2022-07-02 | Stop reason: HOSPADM

## 2022-06-28 RX ORDER — SIMETHICONE 80 MG
80 TABLET,CHEWABLE ORAL EVERY 6 HOURS PRN
Status: DISCONTINUED | OUTPATIENT
Start: 2022-06-28 | End: 2022-07-02 | Stop reason: HOSPADM

## 2022-06-28 RX ORDER — MORPHINE SULFATE 1 MG/ML
INJECTION, SOLUTION EPIDURAL; INTRATHECAL; INTRAVENOUS
Status: COMPLETED | OUTPATIENT
Start: 2022-06-28 | End: 2022-06-28

## 2022-06-28 RX ORDER — OXYCODONE HYDROCHLORIDE 5 MG/1
5 TABLET ORAL EVERY 4 HOURS PRN
Status: DISCONTINUED | OUTPATIENT
Start: 2022-06-29 | End: 2022-07-02 | Stop reason: HOSPADM

## 2022-06-28 RX ORDER — ONDANSETRON 2 MG/ML
4 INJECTION INTRAMUSCULAR; INTRAVENOUS EVERY 6 HOURS PRN
Status: DISCONTINUED | OUTPATIENT
Start: 2022-06-28 | End: 2022-06-28 | Stop reason: SDUPTHER

## 2022-06-28 RX ORDER — IBUPROFEN 600 MG/1
600 TABLET ORAL EVERY 6 HOURS
Status: DISCONTINUED | OUTPATIENT
Start: 2022-06-29 | End: 2022-06-29

## 2022-06-28 RX ORDER — OXYCODONE HYDROCHLORIDE 5 MG/1
5 TABLET ORAL EVERY 6 HOURS PRN
Qty: 28 TABLET | Refills: 0 | Status: SHIPPED | OUTPATIENT
Start: 2022-06-28 | End: 2022-07-05

## 2022-06-28 RX ORDER — IBUPROFEN 600 MG/1
600 TABLET ORAL EVERY 6 HOURS PRN
Qty: 30 TABLET | Refills: 1 | Status: SHIPPED | OUTPATIENT
Start: 2022-06-28

## 2022-06-28 RX ORDER — SODIUM CHLORIDE, SODIUM LACTATE, POTASSIUM CHLORIDE, AND CALCIUM CHLORIDE .6; .31; .03; .02 G/100ML; G/100ML; G/100ML; G/100ML
500 INJECTION, SOLUTION INTRAVENOUS PRN
Status: DISCONTINUED | OUTPATIENT
Start: 2022-06-28 | End: 2022-06-28

## 2022-06-28 RX ORDER — TRANEXAMIC ACID 100 MG/ML
INJECTION, SOLUTION INTRAVENOUS PRN
Status: DISCONTINUED | OUTPATIENT
Start: 2022-06-28 | End: 2022-06-28 | Stop reason: SDUPTHER

## 2022-06-28 RX ORDER — ONDANSETRON 2 MG/ML
4 INJECTION INTRAMUSCULAR; INTRAVENOUS EVERY 6 HOURS PRN
Status: DISCONTINUED | OUTPATIENT
Start: 2022-06-28 | End: 2022-06-28

## 2022-06-28 RX ORDER — SCOLOPAMINE TRANSDERMAL SYSTEM 1 MG/1
1 PATCH, EXTENDED RELEASE TRANSDERMAL
Status: DISCONTINUED | OUTPATIENT
Start: 2022-06-28 | End: 2022-07-02 | Stop reason: HOSPADM

## 2022-06-28 RX ORDER — TRISODIUM CITRATE DIHYDRATE AND CITRIC ACID MONOHYDRATE 500; 334 MG/5ML; MG/5ML
30 SOLUTION ORAL ONCE
Status: COMPLETED | OUTPATIENT
Start: 2022-06-28 | End: 2022-06-28

## 2022-06-28 RX ORDER — SODIUM CHLORIDE 9 MG/ML
INJECTION, SOLUTION INTRAVENOUS PRN
Status: DISCONTINUED | OUTPATIENT
Start: 2022-06-28 | End: 2022-07-02 | Stop reason: HOSPADM

## 2022-06-28 RX ORDER — BUPIVACAINE HYDROCHLORIDE 7.5 MG/ML
INJECTION, SOLUTION INTRASPINAL
Status: COMPLETED | OUTPATIENT
Start: 2022-06-28 | End: 2022-06-28

## 2022-06-28 RX ORDER — SODIUM CHLORIDE, SODIUM LACTATE, POTASSIUM CHLORIDE, CALCIUM CHLORIDE 600; 310; 30; 20 MG/100ML; MG/100ML; MG/100ML; MG/100ML
INJECTION, SOLUTION INTRAVENOUS CONTINUOUS
Status: DISCONTINUED | OUTPATIENT
Start: 2022-06-28 | End: 2022-06-28

## 2022-06-28 RX ORDER — ACETAMINOPHEN 500 MG
1000 TABLET ORAL EVERY 6 HOURS PRN
Status: DISCONTINUED | OUTPATIENT
Start: 2022-06-28 | End: 2022-06-28 | Stop reason: SDUPTHER

## 2022-06-28 RX ORDER — DIPHENHYDRAMINE HYDROCHLORIDE 50 MG/ML
INJECTION INTRAMUSCULAR; INTRAVENOUS PRN
Status: DISCONTINUED | OUTPATIENT
Start: 2022-06-28 | End: 2022-06-28 | Stop reason: SDUPTHER

## 2022-06-28 RX ORDER — LIDOCAINE HYDROCHLORIDE 10 MG/ML
30 INJECTION, SOLUTION EPIDURAL; INFILTRATION; INTRACAUDAL; PERINEURAL PRN
Status: DISCONTINUED | OUTPATIENT
Start: 2022-06-28 | End: 2022-06-28

## 2022-06-28 RX ORDER — METHYLERGONOVINE MALEATE 0.2 MG/ML
INJECTION INTRAVENOUS PRN
Status: DISCONTINUED | OUTPATIENT
Start: 2022-06-28 | End: 2022-06-28 | Stop reason: SDUPTHER

## 2022-06-28 RX ORDER — SODIUM CHLORIDE, SODIUM LACTATE, POTASSIUM CHLORIDE, CALCIUM CHLORIDE 600; 310; 30; 20 MG/100ML; MG/100ML; MG/100ML; MG/100ML
INJECTION, SOLUTION INTRAVENOUS CONTINUOUS
Status: DISCONTINUED | OUTPATIENT
Start: 2022-06-28 | End: 2022-06-29

## 2022-06-28 RX ORDER — ROPIVACAINE HYDROCHLORIDE 2 MG/ML
INJECTION, SOLUTION EPIDURAL; INFILTRATION; PERINEURAL PRN
Status: DISCONTINUED | OUTPATIENT
Start: 2022-06-28 | End: 2022-06-28 | Stop reason: SDUPTHER

## 2022-06-28 RX ORDER — DOCUSATE SODIUM 100 MG/1
100 CAPSULE, LIQUID FILLED ORAL 2 TIMES DAILY
Qty: 60 CAPSULE | Refills: 1 | Status: SHIPPED | OUTPATIENT
Start: 2022-06-28 | End: 2022-07-28

## 2022-06-28 RX ORDER — EPHEDRINE SULFATE 50 MG/ML
10 INJECTION INTRAVENOUS EVERY 5 MIN PRN
Status: DISCONTINUED | OUTPATIENT
Start: 2022-06-28 | End: 2022-06-28

## 2022-06-28 RX ORDER — SODIUM CHLORIDE 0.9 % (FLUSH) 0.9 %
5-40 SYRINGE (ML) INJECTION PRN
Status: DISCONTINUED | OUTPATIENT
Start: 2022-06-28 | End: 2022-06-28

## 2022-06-28 RX ORDER — SODIUM CHLORIDE 9 MG/ML
25 INJECTION, SOLUTION INTRAVENOUS PRN
Status: DISCONTINUED | OUTPATIENT
Start: 2022-06-28 | End: 2022-06-28

## 2022-06-28 RX ORDER — VITAMIN A, ASCORBIC ACID, CHOLECALCIFEROL, .ALPHA.-TOCOPHEROL ACETATE, DL-, THIAMINE MONONITRATE, RIBOFLAVIN, NIACINAMIDE, PYRIDOXINE HYDROCHLORIDE, FOLIC ACID, CYANOCOBALAMIN, CALCIUM CARBONATE, IRON, ZINC OXIDE, AND CUPRIC OXIDE 4000; 120; 400; 22; 1.84; 3; 20; 10; 1; 12; 200; 29; 25; 2 [IU]/1; MG/1; [IU]/1; [IU]/1; MG/1; MG/1; MG/1; MG/1; MG/1; UG/1; MG/1; MG/1; MG/1; MG/1
1 TABLET ORAL DAILY
Status: DISCONTINUED | OUTPATIENT
Start: 2022-06-29 | End: 2022-07-02 | Stop reason: HOSPADM

## 2022-06-28 RX ORDER — ONDANSETRON 2 MG/ML
4 INJECTION INTRAMUSCULAR; INTRAVENOUS EVERY 6 HOURS PRN
Status: DISCONTINUED | OUTPATIENT
Start: 2022-06-28 | End: 2022-07-02 | Stop reason: HOSPADM

## 2022-06-28 RX ORDER — ONDANSETRON 4 MG/1
4 TABLET, ORALLY DISINTEGRATING ORAL EVERY 8 HOURS PRN
Status: DISCONTINUED | OUTPATIENT
Start: 2022-06-28 | End: 2022-06-28

## 2022-06-28 RX ORDER — KETOROLAC TROMETHAMINE 30 MG/ML
30 INJECTION, SOLUTION INTRAMUSCULAR; INTRAVENOUS EVERY 6 HOURS
Status: DISCONTINUED | OUTPATIENT
Start: 2022-06-29 | End: 2022-06-28 | Stop reason: HOSPADM

## 2022-06-28 RX ORDER — POLYETHYLENE GLYCOL 3350 17 G/17G
17 POWDER, FOR SOLUTION ORAL DAILY PRN
Status: DISCONTINUED | OUTPATIENT
Start: 2022-06-28 | End: 2022-07-02 | Stop reason: HOSPADM

## 2022-06-28 RX ORDER — SODIUM CHLORIDE, SODIUM LACTATE, POTASSIUM CHLORIDE, CALCIUM CHLORIDE 600; 310; 30; 20 MG/100ML; MG/100ML; MG/100ML; MG/100ML
INJECTION, SOLUTION INTRAVENOUS CONTINUOUS PRN
Status: DISCONTINUED | OUTPATIENT
Start: 2022-06-28 | End: 2022-06-28 | Stop reason: SDUPTHER

## 2022-06-28 RX ORDER — DIPHENHYDRAMINE HYDROCHLORIDE 50 MG/ML
25 INJECTION INTRAMUSCULAR; INTRAVENOUS EVERY 6 HOURS PRN
Status: DISCONTINUED | OUTPATIENT
Start: 2022-06-28 | End: 2022-06-28

## 2022-06-28 RX ORDER — DOCUSATE SODIUM 100 MG/1
100 CAPSULE, LIQUID FILLED ORAL 2 TIMES DAILY
Status: DISCONTINUED | OUTPATIENT
Start: 2022-06-28 | End: 2022-07-02 | Stop reason: HOSPADM

## 2022-06-28 RX ADMIN — SODIUM CHLORIDE, POTASSIUM CHLORIDE, SODIUM LACTATE AND CALCIUM CHLORIDE: 600; 310; 30; 20 INJECTION, SOLUTION INTRAVENOUS at 17:14

## 2022-06-28 RX ADMIN — SODIUM CITRATE AND CITRIC ACID MONOHYDRATE 30 ML: 500; 334 SOLUTION ORAL at 17:02

## 2022-06-28 RX ADMIN — ROPIVACAINE HYDROCHLORIDE 10 ML/HR: 2 INJECTION, SOLUTION EPIDURAL; INFILTRATION at 10:06

## 2022-06-28 RX ADMIN — Medication 1 MILLI-UNITS/MIN: at 06:31

## 2022-06-28 RX ADMIN — SODIUM CHLORIDE, POTASSIUM CHLORIDE, SODIUM LACTATE AND CALCIUM CHLORIDE: 600; 310; 30; 20 INJECTION, SOLUTION INTRAVENOUS at 04:20

## 2022-06-28 RX ADMIN — BUPIVACAINE HYDROCHLORIDE IN DEXTROSE 12 MG: 7.5 INJECTION, SOLUTION SUBARACHNOID at 17:17

## 2022-06-28 RX ADMIN — TRANEXAMIC ACID 1000 MG: 100 INJECTION, SOLUTION INTRAVENOUS at 17:35

## 2022-06-28 RX ADMIN — ACETAMINOPHEN 1000 MG: 500 TABLET ORAL at 05:01

## 2022-06-28 RX ADMIN — KETOROLAC TROMETHAMINE 30 MG: 30 INJECTION, SOLUTION INTRAMUSCULAR; INTRAVENOUS at 18:59

## 2022-06-28 RX ADMIN — METHYLERGONOVINE MALEATE 200 MCG: 0.2 INJECTION, SOLUTION INTRAMUSCULAR; INTRAVENOUS at 17:48

## 2022-06-28 RX ADMIN — FAMOTIDINE 20 MG: 10 INJECTION, SOLUTION INTRAVENOUS at 17:02

## 2022-06-28 RX ADMIN — MORPHINE SULFATE 0.2 MG: 1 INJECTION, SOLUTION EPIDURAL; INTRATHECAL; INTRAVENOUS at 17:17

## 2022-06-28 RX ADMIN — LIDOCAINE HYDROCHLORIDE,EPINEPHRINE BITARTRATE 5 ML: 15; .005 INJECTION, SOLUTION EPIDURAL; INFILTRATION; INTRACAUDAL; PERINEURAL at 09:52

## 2022-06-28 RX ADMIN — WATER 2000 MG: 100 INJECTION, SOLUTION INTRAVENOUS at 17:03

## 2022-06-28 RX ADMIN — ROPIVACAINE HYDROCHLORIDE 5 ML: 2 INJECTION, SOLUTION EPIDURAL; INFILTRATION at 10:02

## 2022-06-28 RX ADMIN — Medication 909 ML/HR: at 17:43

## 2022-06-28 RX ADMIN — ONDANSETRON 4 MG: 2 INJECTION INTRAMUSCULAR; INTRAVENOUS at 17:34

## 2022-06-28 RX ADMIN — ACETAMINOPHEN 1000 MG: 500 TABLET ORAL at 17:02

## 2022-06-28 RX ADMIN — Medication 500 MG: at 17:26

## 2022-06-28 RX ADMIN — DIPHENHYDRAMINE HYDROCHLORIDE 25 MG: 50 INJECTION, SOLUTION INTRAMUSCULAR; INTRAVENOUS at 18:02

## 2022-06-28 RX ADMIN — DIPHENHYDRAMINE HYDROCHLORIDE 25 MG: 50 INJECTION, SOLUTION INTRAMUSCULAR; INTRAVENOUS at 05:00

## 2022-06-28 RX ADMIN — DEXAMETHASONE SODIUM PHOSPHATE 10 MG: 10 INJECTION INTRAMUSCULAR; INTRAVENOUS at 18:02

## 2022-06-28 ASSESSMENT — PAIN - FUNCTIONAL ASSESSMENT: PAIN_FUNCTIONAL_ASSESSMENT: ACTIVITIES ARE NOT PREVENTED

## 2022-06-28 ASSESSMENT — PAIN DESCRIPTION - LOCATION: LOCATION: ABDOMEN

## 2022-06-28 ASSESSMENT — PAIN DESCRIPTION - DESCRIPTORS
DESCRIPTORS: DISCOMFORT;CRAMPING;TIGHTNESS
DESCRIPTORS: CRAMPING

## 2022-06-28 ASSESSMENT — PAIN DESCRIPTION - ORIENTATION: ORIENTATION: LOWER;MID

## 2022-06-28 ASSESSMENT — PAIN SCALES - GENERAL: PAINLEVEL_OUTOF10: 7

## 2022-06-28 NOTE — H&P
bruising  Immunologic/Lymphatic: negative recent illness, negative recent sick contact  Musculoskeletal: negative back pain, negative myalgias, negative arthralgias  Neurological:  negative dizziness, negative weakness  Behavior/Psych: negative depression, negative anxiety    OBSTETRICAL HISTORY:   OB History    Para Term  AB Living   1 0 0 0 0 0   SAB IAB Ectopic Molar Multiple Live Births   0 0 0 0 0 0      # Outcome Date GA Lbr Victor Manuel/2nd Weight Sex Delivery Anes PTL Lv   1 Current              PAST MEDICAL HISTORY:   has a past medical history of Abnormal Pap smear of cervix, Anxiety, Attention deficit disorder (ADD) without hyperactivity, Depression, Encounter for assisted reproductive fertility cycle, History of depression, and Neurologic disorder. PAST SURGICAL HISTORY:   has a past surgical history that includes Hymenectomy (). ALLERGIES:  is allergic to sulfa antibiotics and sulfamethoxazole-trimethoprim. MEDICATIONS:  Prior to Admission medications    Medication Sig Start Date End Date Taking? Authorizing Provider   Misc. Devices MISC Breast pump  Patient not taking: Reported on 2022   Pamela Fleming MD   sertraline (ZOLOFT) 100 MG tablet Take 1 tablet by mouth daily 21   Pamela Fleming MD   Docosahexaenoic Acid (PRENATAL DHA PO) Take by mouth    Historical Provider, MD     FAMILY HISTORY:  family history includes Alzheimer's Disease in her maternal grandmother and paternal grandfather; Breast Cancer in her paternal grandmother; Colon Cancer in her maternal aunt; Colon Cancer (age of onset: 36) in her mother; Coronary Art Dis in her maternal grandfather; Hypertension in her father; Kidney Disease in her maternal grandfather and mother; Teresa Mookie in her maternal grandfather; No Known Problems in her brother and sister; Other in her mother; Stroke in her paternal aunt; Thyroid Disease in her mother.     SOCIAL HISTORY:   reports that she has never smoked. She has never used smokeless tobacco. She reports previous alcohol use. She reports that she does not use drugs.     VITALS:  Vitals:    06/28/22 0315   BP: 135/83   Pulse: 75   Resp: 16   Temp: 97.5 °F (36.4 °C)   TempSrc: Oral   SpO2: 98%     PHYSICAL EXAM:  Fetal Heart Monitor:  Baseline Heart Rate 145, moderate variability, absent accelerations, one 4 minute prolonged decelerations from 150 bpm to 125 bpm  Ignacio: irregular, every 6-8 minutes contractions    General appearance:  no apparent distress, alert, and cooperative  HEENT: head atraumatic, normocephalic, moist mucous membranes, trachea midline  Neurologic:  alert, oriented, normal speech, no focal findings or movement disorder noted  Lungs:  No increased work of breathing, good air exchange, clear to auscultation bilaterally, no crackles or wheezing  Heart:  regular rate and rhythm and no murmur    Abdomen:  soft, gravid, and non-tender  Extremities:  no calf tenderness, non edematous   Musculoskeletal: Gross strength equal and intact throughout, no gross abnormalities, range of motion normal in hips, knees, shoulders and spine  Psychiatric: Mood appropriate, normal affect   Rectal Exam: not indicated  Pelvic Exam:  Chaperone for Intimate Exam: Chaperone was present for entire exam, Chaperone Name: Louise Oswald RN  Sterile Speculum Exam:   Urethral meatus: normal appearing   Vulva: Normal hair distribution, normal appearing vulva, no masses, tenderness or lesions, normal clitoris   Vagina: Normal appearing vaginal mucosa without lesions, scant vaginal discharge noted in the posterior vault, no lacerations   Cervix: Normal appearing cervix without lesions, external os visibly dilated to 1 cm, membranes visible at the external cervical os, no lacerations or abnormal lesions visualized  Sterile Vaginal Exam:  Cervix: No cervical motion tenderness   Uterus: Is gravid, Normal size, shape, consistency and non-tender  Cervix: 1 cm dilated, 50 % effaced, -2 station, mid position (out of 3 station), soft consistency, FETAL POSITION: Cephalic (confirmed by ultrasound), Membranes intact,    Bishops Score: 6     0 1 2 3   Position Posterior Intermediate Anterior -   Consistency Firm Intermediate Soft -   Effacement 0-30% 31-50% 51-80% >80%   Dilation 0cm 1-2cm 3-4cm >5cm   Fetal Station -3 -2 -1, 0 +1, +2     DATA:  Membranes Ruptured: No  Valsalva/Pooling: absent  Vaginal Bleeding: present, old dark blood in posterior vault, no active bleeding from cervix    LIMITED BEDSIDE US:  Position: Cephalic  Placental Location: anterior  Fetal Heart Tones: Present  Fetal Movement: Present  Amniotic Fluid Index/Volume: adequate 2x2 cm pocket of fluid  Estimated Fetal Weight:  8 lbs 7oz    PRENATAL LAB RESULTS:  Blood Type/Rh: B pos  Antibody Screen: negative  Hemoglobin, Hematocrit, Platelets: Hgb 35.4/OCM 37.4/Plt 222  Rubella: immune  T. Pallidum, IgG: non-reactive  Hepatitis B Surface Antigen: non-reactive   Hepatitis C Antibody: not done   HIV: non-reactive   Sickle Cell Screen: negative  Gonorrhea: negative  Chlamydia: negative  Urine culture: negative, date: 21    1 hour Glucose Tolerance Test:  109    Group B Strep: negative RV culture on 6/3/22  Cystic Fibrosis Screen: negative  First Trimester Screen: not done  MSAFP: negative  Non-Invasive Prenatal Testing: low risk for aneuploidy  Anatomy US: anterior placenta, 3VC, male gender, normal anatomy    ASSESSMENT & PLAN:  Ariane Munson is a 32 y.o. female  at 37w11d IUP   - GBS negative / Rh positive / R immune   - No indication for GBS prophylaxis    Contractions  - Patient complaining of contractions since Monday which worsened yesterday and woke her up from sleep.    - cEFM/TOCO - Cat 2 tracing, irregular contractions q 6-8 mins   - SVE: /-2   - Given prolonged deceleration and no accelerations present on FHT, will plan to admit patient for augmentation of labor under the service of Dr. Adrián Scott   - CBC, T&S, ZAKIAPal ordered   - UDS ordered. R/B/A discussed with patient and patient agreeable   - IVF: LR @ 125 cc/hr   - Clear liquid diet   - LBUS: Cephalic, EFW 8lb 7oz   - Plan of Induction: weaver balloon with low dose pitocin     Vaginal spotting   - SSE: old dark blood in the posterior vaginal vault. No active bleeding from cervix. Cervix visually dilated to 1cm   - Vaginitis collected and results pending    Pregnancy by IUI   - Fetal echo wnl    Fetal Exposure to SSRI   - Fetal echo wnl      Heterozygous for CHEK2 gene   - Two-fold Increased risk of breast and colon cancer      ASCUS (+ other HRHPV)   - Noted on 8/2021   - Follow up PP      Depression   - Patient has good support at home. Wife at bedside and very supportive   - Mood stable on Zoloft 100 qd      BMI 30  Patient Active Problem List    Diagnosis Date Noted    39 weeks gestation of pregnancy 06/28/2022     Priority: Medium    Need for Tdap vaccination 04/05/2022 4/5/22      COVID-19 vaccine series completed 03/22/2022     Booster 3/18      High risk for colon cancer 02/07/2022     Start at 36 and every 5 years per genetic testing      Increased risk of breast cancer 02/07/2022     Patient heterozygous for CHEK2 gene  Mammogram and MRI yearly at 40 per genetic testing note. Pregnancy through Sperm donor 01/26/2022    Depression 11/30/2021    ASCUS with positive high risk HPV cervical 08/24/2021     ASCUS + other high risk HPV 8/10/2021      Thoracic back sprain 06/24/2021       Plan discussed with Dr. Angelina Duron, who is agreeable. Steroids given this admission: No    Risks, benefits, alternatives and possible complications have been discussed in detail with the patient. Admission, and post admission procedures and expectations were discussed in detail. All questions were answered.     Attending's Name: Dr. Parris Mcgregor DO  Ob/Gyn Resident  6/28/2022, 6:48 AM    Attending Physician Statement  I have discussed the care of Lin Mitchell including pertinent history and exam findings with the resident. I have reviewed and edited their note in the electronic medical record. The key elements of all parts of the encounter have been performed/reviewed by me . I agree with the assessment, plan and orders as documented by the resident. The level of care submitted represents to the best of my ability the care documented in the medical record today. GC Modifier. This service has been performed in part by a resident under the direction of a teaching physician.    32 y.o. Bob Veloz at 39w6d here with contractions and a category 2 tracing. Plan to keep for augmentation and place weaver balloon with low dose pitocin.     Attending's Name:  Ye Marquez,

## 2022-06-28 NOTE — FLOWSHEET NOTE
65 [facility] at bedside. Epidural procedure explained, risks discussed. Pt verbalizes consent for epidural.   0935 patient positioned for epidural.0940 Time out completed. 0950 catheter placed. 9727 test dose given, maternal HR 89 bpm.  Epidural catheter taped and secured per anesthesia. 7568 to low fowlers with left uterine displacement. 1001 loading dose given. 1005 pump initiated. Pt tolerated procedure well.

## 2022-06-28 NOTE — PLAN OF CARE
Problem: Pain  Goal: Verbalizes/displays adequate comfort level or baseline comfort level  Outcome: Progressing     Problem: Vaginal Birth or  Section  Goal: Fetal and maternal status remain reassuring during the birth process  Description:  Birth OB-Pregnancy care plan goal which identifies if the fetal and maternal status remain reassuring during the birth process  Outcome: Progressing

## 2022-06-28 NOTE — ANESTHESIA PROCEDURE NOTES
Epidural Block    Patient location during procedure: OB  Start time: 6/28/2022 9:42 AM  End time: 6/28/2022 9:52 AM  Reason for block: labor epidural  Staffing  Performed: resident/CRNA   Anesthesiologist: Tanya Holden MD  Resident/CRNA: ABIMBOLA Richards CRNA  Epidural  Patient position: sitting  Prep: Betadine  Patient monitoring: continuous pulse ox and frequent blood pressure checks  Approach: midline  Location: L3-4  Injection technique: CRYS saline  Provider prep: mask and sterile gloves  Needle  Needle type: Tuohy   Needle gauge: 17 G  Needle length: 3.5 in  Needle insertion depth: 5 cm  Catheter type: side hole  Catheter size: 19 G  Catheter at skin depth: 11 cm  Test dose: negativeCatheter Secured: tegaderm and tape  Assessment  Hemodynamics: stable  Attempts: 1  Outcomes: uncomplicated and patient tolerated procedure well  Preanesthetic Checklist  Completed: patient identified, IV checked, site marked, risks and benefits discussed, surgical/procedural consents, equipment checked, pre-op evaluation, timeout performed, anesthesia consent given, oxygen available and monitors applied/VS acknowledged

## 2022-06-28 NOTE — ANESTHESIA PROCEDURE NOTES
Spinal Block    Patient location during procedure: OR  End time: 6/28/2022 5:22 PM  Reason for block: primary anesthetic and at surgeon's request  Staffing  Performed: anesthesiologist   Anesthesiologist: Flor Corona MD  Spinal Block  Patient position: sitting  Prep: Betadine  Patient monitoring: frequent blood pressure checks and continuous pulse ox  Approach: midline  Location: L3/L4  Provider prep: sterile gloves and mask  Local infiltration: lidocaine  Needle  Needle type: Pencan   Needle gauge: 24 G  Needle length: 4 in  Assessment  Sensory level: T4  Swirl obtained: Yes  CSF: clear  Attempts: 1  Hemodynamics: stable  Preanesthetic Checklist  Completed: patient identified, IV checked, site marked, risks and benefits discussed, surgical/procedural consents, equipment checked, pre-op evaluation, timeout performed, anesthesia consent given, oxygen available and monitors applied/VS acknowledged

## 2022-06-28 NOTE — PROGRESS NOTES
Ob/Gyn Resident Progress Note:    Pushing with adequate maternal effort at bedside for 1 hour with Dr. Lidia Glover. Cervical anterior lip remained throughout the entirety of pushing and was unable to be reduced. CPD suspected. Also noted was significant pain with pushing despite the patient's epidural.  Patient reported multiple spots of breakthrough pain. No fetal descent noted with adequate effort. Patient requesting primary elective  secondary to lack of progress and immense pain with pushing despite epidural.    Patient counseled on the R/B/A of a  section versus vaginal delivery.  consent signed. Antibiotics ordered. We will plan to remove epidural and request a spinal.    Dr. Lidia Glover at bedside for the entire encounter.     Jenn Garcia DO  OB/GYN Resident, PGY2  [facility]  2022 4:18 PM

## 2022-06-28 NOTE — ANESTHESIA PRE PROCEDURE
Department of Anesthesiology  Preprocedure Note       Name:  Darwin Ely   Age:  32 y.o.  :  1995                                          MRN:  [id number]         Date:  2022      Surgeon: Wm Roy    Procedure: Labor epidural    Medications prior to admission:   Prior to Admission medications    Medication Sig Start Date End Date Taking? Authorizing Provider   Misc.  Devices MISC Breast pump  Patient not taking: Reported on 2022   Philip Camarena MD   sertraline (ZOLOFT) 100 MG tablet Take 1 tablet by mouth daily 21   Philip Camarena MD   Docosahexaenoic Acid (PRENATAL DHA PO) Take by mouth    Historical Provider, MD       Current medications:    Current Facility-Administered Medications   Medication Dose Route Frequency Provider Last Rate Last Admin    ondansetron (ZOFRAN-ODT) disintegrating tablet 4 mg  4 mg Oral Q8H PRN Gage Crowe MD        Or    ondansetron (ZOFRAN) injection 4 mg  4 mg IntraVENous Q6H PRN Gage Crowe MD        sertraline (ZOLOFT) tablet 100 mg  100 mg Oral Daily Gage Crowe MD        lactated ringers infusion   IntraVENous Continuous Gage Crowe  mL/hr at 22 0420 New Bag at 22 0420    lactated ringers bolus  500 mL IntraVENous PRN Gage Crowe MD        Or    lactated ringers bolus  1,000 mL IntraVENous PRN Gage Crowe MD        sodium chloride flush 0.9 % injection 5-40 mL  5-40 mL IntraVENous 2 times per day Gage Crowe MD        sodium chloride flush 0.9 % injection 5-40 mL  5-40 mL IntraVENous PRN Gage Crowe MD        0.9 % sodium chloride infusion  25 mL IntraVENous PRN Gage Crowe MD        lidocaine PF 1 % injection 30 mL  30 mL Other PRN Gage Crowe MD        acetaminophen (TYLENOL) tablet 1,000 mg  1,000 mg Oral Q6H PRN Gage Crowe MD   1,000 mg at 22 0501    benzocaine-menthol (DERMOPLAST) 20-0.5 % spray   Topical PRN Gage Crowe MD        oxytocin (PITOCIN) 30 units in 500 mL infusion  1-20 yolande-units/min IntraVENous Continuous Gelene Flirt, DO 4 mL/hr at 06/28/22 0800 4 yolande-units/min at 06/28/22 0800    diphenhydrAMINE (BENADRYL) injection 25 mg  25 mg IntraVENous Q6H PRN Gelene Flirt, DO   25 mg at 06/28/22 0500    ropivacaine (NAROPIN) 0.2% injection 0.2%                Allergies: Allergies   Allergen Reactions    Sulfa Antibiotics Itching and Other (See Comments)     Burning internally    Sulfamethoxazole-Trimethoprim        Problem List:    Patient Active Problem List   Diagnosis Code    Thoracic back sprain S23. 9XXA    ASCUS with positive high risk HPV cervical R87.610, R87.810    Depression F32. A    Pregnancy through Sperm donor Z53.1    High risk for colon cancer Z91.89    Increased risk of breast cancer Z91.89    COVID-19 vaccine series completed Z92.29    Need for Tdap vaccination Z23    39 weeks gestation of pregnancy Z3A.39       Past Medical History:        Diagnosis Date    Abnormal Pap smear of cervix     Anxiety     Attention deficit disorder (ADD) without hyperactivity     child but never treated    Depression     Encounter for assisted reproductive fertility cycle     IUI, partner female    History of depression     Neurologic disorder     fingers on right hand, fingers go number       Past Surgical History:        Procedure Laterality Date    HYMENECTOMY  2014       Social History:    Social History     Tobacco Use    Smoking status: Never Smoker    Smokeless tobacco: Never Used   Substance Use Topics    Alcohol use: Not Currently     Comment: socially 2x /mth                                Counseling given: Not Answered      Vital Signs (Current):   Vitals:    06/28/22 0315 06/28/22 0630 06/28/22 0800 06/28/22 0900   BP: 135/83 115/65 123/72 127/68   Pulse: 75 72 85 76   Resp: 16 16 16 16   Temp: 36.4 °C (97.5 °F) 37.1 °C (98.7 °F)     TempSrc: Oral Oral     SpO2: 98%  99%                                               BP Readings from Last 3 Encounters:   06/28/22 127/68   06/24/22 115/71   06/17/22 116/79       NPO Status:                                                                                 BMI:   Wt Readings from Last 3 Encounters:   06/24/22 164 lb 9.6 oz (74.7 kg)   06/17/22 162 lb 12.8 oz (73.8 kg)   06/10/22 160 lb 12.8 oz (72.9 kg)     There is no height or weight on file to calculate BMI.    CBC:   Lab Results   Component Value Date    WBC 11.7 06/28/2022    RBC 4.30 06/28/2022    HGB 10.6 06/28/2022    HCT 33.5 06/28/2022    MCV 77.9 06/28/2022    RDW 14.7 06/28/2022     06/28/2022       CMP:   Lab Results   Component Value Date     11/02/2018    K 3.6 11/02/2018     11/02/2018    CO2 24 11/02/2018    BUN 10 11/02/2018    CREATININE 0.76 11/02/2018    GFRAA >60 11/02/2018    LABGLOM >60 11/02/2018    GLUCOSE 109 03/28/2022    GLUCOSE 100 11/02/2018    CALCIUM 9.5 11/02/2018       POC Tests: No results for input(s): POCGLU, POCNA, POCK, POCCL, POCBUN, POCHEMO, POCHCT in the last 72 hours.     Coags: No results found for: PROTIME, INR, APTT    HCG (If Applicable):   Lab Results   Component Value Date    PREGTESTUR neg 11/02/2018    HCG NEGATIVE 11/02/2018        ABGs: No results found for: PHART, PO2ART, OIS4MZJ, XRM1GJW, BEART, L6MSGAQS     Type & Screen (If Applicable):  No results found for: LABABO, LABRH    Drug/Infectious Status (If Applicable):  No results found for: HIV, HEPCAB    COVID-19 Screening (If Applicable): No results found for: COVID19        Anesthesia Evaluation   no history of anesthetic complications:   Airway: Mallampati: II  TM distance: >3 FB   Neck ROM: full  Mouth opening: > = 3 FB   Dental: normal exam         Pulmonary:Negative Pulmonary ROS and normal exam                               Cardiovascular:Negative CV ROS            Rhythm: regular  Rate: normal                    Neuro/Psych:   (+) psychiatric history (ADHD):            GI/Hepatic/Renal:   (+) GERD:, Endo/Other:                     Abdominal:             Vascular: Other Findings:  uterus          Anesthesia Plan      epidural     ASA 2     (Platelets 788)        Anesthetic plan and risks discussed with patient. Use of blood products discussed with patient whom consented to blood products. Plan discussed with attending.                     ABIMBOLA Loving - CRNA   2022

## 2022-06-28 NOTE — DISCHARGE SUMMARY
Obstetric Discharge Summary  [facility]    Patient Name: Conchis Beebe  Patient : 1995  Primary Care Physician: Edita Julien MD  Admit Date: 2022    Principal Diagnosis: IUP at 39w6d, admitted for induction of labor secondary to category 2 tracing     Her pregnancy has been complicated by:   Patient Active Problem List   Diagnosis    Thoracic back sprain    ASCUS with positive high risk HPV cervical    Depression    Pregnancy through Sperm donor    High risk for colon cancer    Increased risk of breast cancer    COVID-19 vaccine series completed    Need for Tdap vaccination    39 weeks gestation of pregnancy    PLTCS 22 M Apg 7/8 Wt 8#8     Infection Present?: No  Hospital Acquired: No    Surgical Operations & Procedures:  Analgesia: spinal  Delivery Type:  Delivery: See Labor and Delivery Summary   Laceration(s): NA    Consultations: NICU and Anesthesia    Pertinent Findings & Procedures:   Conchis Beebe is a 32 y.o. female  at 37w11d admitted for induction of labor secondary to category 2 FHT; received weaver balloon, pitocin, SROM (thick mec), epidural. Patient pushed for 1 hour with adequate maternal effort. Anterior cervical lip remained and significant pain was noted. Patient requested a primary elective  section. She received Ancef and Azithromycin pre-operatively. She delivered by primary low transverse  a Live Born infant on 22. She received a dose of Methergine and TXA intraop. Information for the patient's :  Cesia Hidalgo [[id number]]   male   Birth Weight: 8 lb 8.9 oz (3.88 kg)       Apgars: 7 at 1 minute and 8 at 5 minutes. Postpartum course:   POD#1: Hgb decreased to 8.6, Rx for PO iron supplementation given.     Course of patient: uncomplicated    Discharge to: Home    Readmission planned: no     Recommendations on Discharge:     Medications:      Medication List      START taking these medications    docusate sodium 100 MG capsule  Commonly known as: COLACE  Take 1 capsule by mouth 2 times daily     ferrous sulfate 325 (65 Fe) MG tablet  Commonly known as: IRON 325  Take 1 tablet by mouth 2 times daily     ibuprofen 600 MG tablet  Commonly known as: ADVIL;MOTRIN  Take 1 tablet by mouth every 6 hours as needed for Pain     oxyCODONE 5 MG immediate release tablet  Commonly known as: Roxicodone  Take 1 tablet by mouth every 6 hours as needed for Pain for up to 7 days. Intended supply: 7 days. Take lowest dose possible to manage pain        CONTINUE taking these medications    Misc. Devices Misc  Breast pump     PRENATAL DHA PO     sertraline 100 MG tablet  Commonly known as: Zoloft  Take 1 tablet by mouth daily           Where to Get Your Medications      These medications were sent to [facility] [address] [facility], [address], [address]  [address]    Phone: [phone]   · docusate sodium 100 MG capsule  · ferrous sulfate 325 (65 Fe) MG tablet  · ibuprofen 600 MG tablet  · oxyCODONE 5 MG immediate release tablet       Activity: pelvic rest x 6 weeks, no driving narcotics, no lifting greater than 15 lbs  Diet: regular diet  Follow up: 2 weeks    Condition on discharge: stable    Discharge date: 7/2/22    Chris Espinosa DO  Ob/Gyn Resident    Comments:  Home care and follow-up care were reviewed. Pelvic rest, and birth control were reviewed. Signs and symptoms of mastitis and post partum depression were reviewed. The patient is to notify her physician if any of these occur. The patient was counseled on secondary smoke risks and the increased risk of sudden infant death syndrome and respiratory problems to her baby with exposure. She was counseled on various alternate recommendations to decrease the exposure to secondary smoke to her children.

## 2022-06-28 NOTE — BRIEF OP NOTE
Department of Obstetrics and Gynecology  Obstetrical Brief Operative Report  [id number]     Patient: Buck Malone   : 1995  MRN: [id number]       Acct: [de-identified]   Date of Procedure: 22    Pre-operative Diagnosis: 32 y.o. female  at 37w11d   Term Pregnancy   IOL 2/2 Category 2 tracing    section 2/2 maternal request   Anemia   Pregnancy by IUI  Fetal Exposure SSRI   Hetero CHEK2 gene  ASCUS, HPV + (2021)  Depression  BMI 30     Post-operative Diagnosis: Viable male infant. Same as above. Procedure: primary low transverse  section    Surgeon: Apolinar Umana MD  Assistant(s): John Miller DO, PGY2; Lyndsay Kimble MS3     Anesthesia: spinal with Duramorph    Information for the patient's :  Bran Hudson [[id number]]   male   Birth Weight: N/A     Information for the patient's :  Bran Hudson [[id number]]          Findings:  Live Born male infant with APGAR pending, normal appearing uterus tubes and ovaries   Quantitative Blood Loss: pending immediately post-operatively  Total IV Fluids: 1300ml  Urine output: 250ml clear urine   Drains:  weaver catheter  Specimens:  placenta sent to pathology, cord blood and cord gases  Instrument and Sponge Count: Correct  Complications: none  Condition: Infant stable, transfer to Special Care Nursery, Mother stable, transfer to post anesthesia recovery    See dictated operative report for full details.     John Miller DO  Ob/Gyn Resident  2022, 6:20 PM

## 2022-06-28 NOTE — FLOWSHEET NOTE
Patient calls out, spontaneous rupture of membranes. Balloon out and SROM at 1038. Meconium stained fluid noted, NICU notified. Dr. Geovanna Maher notified.

## 2022-06-28 NOTE — OP NOTE
Operative Note  Department of Obstetrics and Gynecology  [facility]     Patient: Sheri Engel   : 1995  MRN: [id number]       Acct: [de-identified]   PCP: Nirali Evans MD  Date of Procedure: 22    Pre-operative Diagnosis: 32 y.o. female  at 37w11d   Term Pregnancy   IOL 2/2 Category 2 tracing    section 2/2 maternal request   Anemia   Pregnancy by IUI  Fetal Exposure SSRI   Hetero CHEK2 gene  ASCUS, HPV + (2021)  Depression  BMI 30    Post-operative Diagnosis: Viable male infant. Same as above. Procedure: primary low transverse  section    Indications: Sheri Engel is a 32year old female  at 39w6d admitted for induction of labor secondary to category 2 FHT; received weaver balloon, pitocin, SROM (thick mec), epidural. Patient pushed for 1 hour with adequate maternal effort. Anterior cervical lip remained and significant pain was noted. Patient requested a primary elective  section. Surgeon: Oscar Dorsey MD  Assistants: Katherine Brady DO, PGY2; Tate Crump, MS3     Anesthesia: spinal with duramorph    Procedure Details   The patient was seen pre-operatively. The risks, benefits, complications, treatment options, and expected outcomes were discussed with the patient. The patient concurred with the proposed plan, giving informed consent. The patient was taken to the Operating Room, identified as Sheri Engel and the procedure verified as  Delivery. A Time Out was held and the above information confirmed. TXA x1 was given pre-operatively. After spinal anesthesia, the patient was draped and prepped in the usual sterile manner. A Pfannenstiel incision was made and carried down through the subcutaneous tissue to the fascia using scalpel. Fascial incision was made and extended transversely using thorne scissors for sharp dissection.  The fascia was  from the underlying rectus tissue superiorly and inferiorly using blunt dissection and thorne scissors. The peritoneum was identified and entered bluntly. Peritoneal incision was extended longitudinally with blunt stretch, bladder retractor was placed. A low transverse uterine incision was made using a new scalpel blade. Blunt stretch on the hysterotomy incision was made revealing thick meconium stained fluid. Delivered from cephalic presentation was a Live Born male infant. The infant was suctioned, dried and the umbilical cord was clamped and cut after one minute delayed cord clamping. The infant was taken to the warmer and attended by NICU for evaluation. A second section of cord was clamped and cut and sent for gases. Cord blood was obtained for evaluation. The placenta was removed manually and spontaneously with gentle traction and appeared intact, whole and that the umbilical cord had three vessels noted. Pitocin was started. The uterine outline appeared normal. The uterus was exteriorized and cleaned of all clots and debris. Uterine atony was noted and uterine massage was started. The decision was made to give Methergine x1. The uterine incision was closed with running sutures of 0 Monocryl. Hemostasis was observed. Uterine atony improved. An imbricating layer was placed with 0 Monocryl in running fashion. The uterus was reintroduced into the abdominal cavity. Bilateral abdominal gutters were cleared of all clots and debris. Bilateral tubes and ovaries were visualized and appeared normal. The hysterotomy was again inspected and found to be hemostatic. Rectus muscles were inspected and found to be hemostatic. Rectus muscle were reapproximated using interrupted 2-0 Vicryl suture. The fascia was then reapproximated with running sutures of 0 Vicryl. The subcuticular space was irrigated copiously. The skin was reapproximated with a 4-0 Monocryl. The skin was then cleansed and dressed with Dermabond in sterile fashion.      Instrument, sponge, and needle counts were correct prior the abdominal closure and at the conclusion of the case. The urine remained clear throughout the case. Ancef and Azithromycin was given for antibiotic prophylaxis. SCDs for DVT prophylaxis remain in place for the post operative period. Dr. Alissa Alex was present for the entire operation. Findings:  Viable male infant in cephalic presentation with Apgars of 8 at 1 minute and 9 at five minutes, normal appearing uterus tubes and ovaries   Quantitative Blood Loss: 870ml  Total IV Fluids: 1300ml  Urine output: 250ml clear urine   Drains:  weaver catheter  Specimens:  placenta sent to pathology, cord blood and cord gases  Instrument and Sponge Count: Correct  Complications: none  Condition: Infant stable, transfer to Special Care Nursery, Mother stable, transfer to post anesthesia recovery       Tamiko Olivares DO  OB/GYN Resident  6/28/2022, 6:44 PM    This dictation was performed by a resident physician and then was thoroughly reviewed by the attending prior to being signed.

## 2022-06-28 NOTE — PROGRESS NOTES
Labor Progress Note    Nishant Palma is a 32 y.o. female  at 37w11d  The patient was seen and examined. Her pain is well controlled with epidural. She reports fetal movement is present, complains of contractions, complains of loss of fluid, denies vaginal bleeding. Vital Signs:  Vitals:    22 1130 22 1200 22 1230 22 1300   BP: 123/78 120/68 (!) 116/57 121/75   Pulse: 83 80 77 94   Resp: 16 16 16 16   Temp:  98.3 °F (36.8 °C)     TempSrc:  Oral     SpO2: 99% 98% 95% 98%         FHT: 140, moderate variability, accelerations present, decelerations absent  Contractions: regular, every 2-5 minutes    Chaperone for Intimate Exam: Chaperone was present for entire exam, Chaperone Name:   Cervical Exam: 8 cm dilated, 90 effaced, -1 station  Pitocin: @ 14 mu/min    Membranes: Ruptured meconium stained  Scalp Electrode in place: absent  Intrauterine Pressure Catheter in Place: absent    Interventions: none    Assessment/Plan:  Nishant Palma is a 32 y.o. female  at 37w11d admitted for IOL 2/2 Cat 2 FHT    - GBS negative, No indication for GBS prophylaxis   - VSS   - cEFM/TOCO   - SVE: 8, 90, -1   - S/P weaver balloon   - Pitocin per protocol   - SROM [facility] @ 1038   - Continue current care    Depression   - On zoloft 100 mg qd        Attending updated and in agreement with plan.     Steff Hernadnez DO  Ob/Gyn Resident  2022, 2:04 PM

## 2022-06-28 NOTE — FLOWSHEET NOTE
Pt presents to L and D, accompanied by s/o, via ambulatory. Pt here for contractions, vaginal bleeding. Pt gowned and in bed, oriented to room and call light. EFM explained and applied. FHT's 140. Dr. Feli Marie notified of admission.

## 2022-06-28 NOTE — CARE COORDINATION
ANTEPARTUM NOTE    38 weeks gestation of pregnancy [Z3A.38]  39 weeks gestation of pregnancy [Z3A.39]    Som Latham was admitted to L&D on 6/28/2022 for contractions @ 39w6d and augmentation of labor    OB GYN Provider: Dr. Ernesto Coy    Will meet with patient after delivery to verify name/address/phone/insurance and discuss discharge planning. Anticipate DC home 2 nights after vaginal delivery or 4 nights after C/S delivery as long as hemodynamically stable.

## 2022-06-29 LAB
ABSOLUTE EOS #: <0.03 K/UL (ref 0–0.44)
ABSOLUTE IMMATURE GRANULOCYTE: 0.16 K/UL (ref 0–0.3)
ABSOLUTE LYMPH #: 1.59 K/UL (ref 1.1–3.7)
ABSOLUTE MONO #: 1.4 K/UL (ref 0.1–1.2)
BASOPHILS # BLD: 0 % (ref 0–2)
BASOPHILS ABSOLUTE: <0.03 K/UL (ref 0–0.2)
CULTURE: NO GROWTH
EOSINOPHILS RELATIVE PERCENT: 0 % (ref 1–4)
HCT VFR BLD CALC: 28.1 % (ref 36.3–47.1)
HEMOGLOBIN: 8.6 G/DL (ref 11.9–15.1)
IMMATURE GRANULOCYTES: 1 %
LYMPHOCYTES # BLD: 8 % (ref 24–43)
MCH RBC QN AUTO: 24.6 PG (ref 25.2–33.5)
MCHC RBC AUTO-ENTMCNC: 30.6 G/DL (ref 28.4–34.8)
MCV RBC AUTO: 80.3 FL (ref 82.6–102.9)
MONOCYTES # BLD: 7 % (ref 3–12)
NRBC AUTOMATED: 0 PER 100 WBC
PDW BLD-RTO: 14.8 % (ref 11.8–14.4)
PLATELET # BLD: 205 K/UL (ref 138–453)
PMV BLD AUTO: 10.5 FL (ref 8.1–13.5)
RBC # BLD: 3.5 M/UL (ref 3.95–5.11)
RBC # BLD: ABNORMAL 10*6/UL
SEG NEUTROPHILS: 84 % (ref 36–65)
SEGMENTED NEUTROPHILS ABSOLUTE COUNT: 16.26 K/UL (ref 1.5–8.1)
SPECIMEN DESCRIPTION: NORMAL
WBC # BLD: 19.4 K/UL (ref 3.5–11.3)

## 2022-06-29 PROCEDURE — 36415 COLL VENOUS BLD VENIPUNCTURE: CPT

## 2022-06-29 PROCEDURE — 6360000002 HC RX W HCPCS: Performed by: STUDENT IN AN ORGANIZED HEALTH CARE EDUCATION/TRAINING PROGRAM

## 2022-06-29 PROCEDURE — 6370000000 HC RX 637 (ALT 250 FOR IP)

## 2022-06-29 PROCEDURE — 2580000003 HC RX 258

## 2022-06-29 PROCEDURE — 1220000000 HC SEMI PRIVATE OB R&B

## 2022-06-29 PROCEDURE — 85025 COMPLETE CBC W/AUTO DIFF WBC: CPT

## 2022-06-29 RX ORDER — IBUPROFEN 600 MG/1
600 TABLET ORAL EVERY 6 HOURS
Status: DISCONTINUED | OUTPATIENT
Start: 2022-06-29 | End: 2022-07-02 | Stop reason: HOSPADM

## 2022-06-29 RX ORDER — FERROUS SULFATE 325(65) MG
325 TABLET ORAL 2 TIMES DAILY
Qty: 60 TABLET | Refills: 0 | Status: SHIPPED | OUTPATIENT
Start: 2022-06-29

## 2022-06-29 RX ORDER — KETOROLAC TROMETHAMINE 30 MG/ML
30 INJECTION, SOLUTION INTRAMUSCULAR; INTRAVENOUS ONCE
Status: COMPLETED | OUTPATIENT
Start: 2022-06-29 | End: 2022-06-29

## 2022-06-29 RX ORDER — KETOROLAC TROMETHAMINE 30 MG/ML
30 INJECTION, SOLUTION INTRAMUSCULAR; INTRAVENOUS ONCE
Status: DISCONTINUED | OUTPATIENT
Start: 2022-06-29 | End: 2022-07-02 | Stop reason: HOSPADM

## 2022-06-29 RX ADMIN — SERTRALINE 100 MG: 50 TABLET, FILM COATED ORAL at 09:46

## 2022-06-29 RX ADMIN — SODIUM CHLORIDE, PRESERVATIVE FREE 10 ML: 5 INJECTION INTRAVENOUS at 09:48

## 2022-06-29 RX ADMIN — IBUPROFEN 600 MG: 600 TABLET, FILM COATED ORAL at 18:45

## 2022-06-29 RX ADMIN — KETOROLAC TROMETHAMINE 30 MG: 30 INJECTION, SOLUTION INTRAMUSCULAR; INTRAVENOUS at 04:55

## 2022-06-29 RX ADMIN — DOCUSATE SODIUM 100 MG: 100 CAPSULE ORAL at 09:48

## 2022-06-29 ASSESSMENT — PAIN - FUNCTIONAL ASSESSMENT: PAIN_FUNCTIONAL_ASSESSMENT: ACTIVITIES ARE NOT PREVENTED

## 2022-06-29 ASSESSMENT — PAIN SCALES - GENERAL
PAINLEVEL_OUTOF10: 0
PAINLEVEL_OUTOF10: 5

## 2022-06-29 ASSESSMENT — PAIN DESCRIPTION - DESCRIPTORS: DESCRIPTORS: SORE

## 2022-06-29 ASSESSMENT — PAIN DESCRIPTION - ORIENTATION: ORIENTATION: LOWER

## 2022-06-29 ASSESSMENT — PAIN DESCRIPTION - LOCATION: LOCATION: ABDOMEN

## 2022-06-29 NOTE — CARE COORDINATION
CASE MANAGEMENT POST-PARTUM TRANSITIONAL CARE PLAN    38 weeks gestation of pregnancy [Z3A.38]  39 weeks gestation of pregnancy [Z3A.39]    OB Provider: Dr. Hollis Mariee met w/ Gopi Willard and her spouse Wanda Abbott at bedside to discuss DCP. She is S/P CS on 2022 @ 39w6d at 1741 of make infant. Due to respiratory failure at birth, infant was transferred to [facility] NICU    Writer verified name/address/phone number correct on facesheet. She states she lives with her wife Wanda Abbott. [employer] and [employer] 2ndry insurance correct. Writer notified Gopi Willard and Wanda Abbott they have 30 days from date of birth to add  to insurance policy. They verbalized understanding and will be adding to both policies    Gopi Willard and Wanda Abbott confirmed a safe place for infant to sleep at home. Infant name on BC: Lenora Khanh. Infant PCP Dr. Lucille Jeffers office most likely.      DME: none  HOME CARE: none    Anticipate DC 2022    Readmission Risk              Risk of Unplanned Readmission:  5

## 2022-06-29 NOTE — LACTATION NOTE
Switch to 27mm flanges, pt says better fit. Reviewed pump settings and frequency, encouraged to call out for assistance as needed.

## 2022-06-29 NOTE — LACTATION NOTE
Pt has been pumping every 2-3 hours, collecting output in a oral syringe and taking to NICU. Pt states she may be able to put to breast within the next 24 hours. Encouraged pt to ask for assistance as needed with feed in the NICU or questions/concerns about pumping. Pt has ordered a breast pump through insurance, but has not received it yet. Reviewed pumping frequency if baby is not latching well once discharged and encouraged follow up.

## 2022-06-29 NOTE — PROGRESS NOTES
POST OPERATIVE DAY # 1    Damion Rodrigues is a 32 y.o. female   This patient was seen and examined today. PLTCS on 6/28/22    Her pregnancy was complicated by:   Patient Active Problem List   Diagnosis    Thoracic back sprain    ASCUS with positive high risk HPV cervical    Depression    Pregnancy through Sperm donor    High risk for colon cancer    Increased risk of breast cancer    COVID-19 vaccine series completed    Need for Tdap vaccination    39 weeks gestation of pregnancy    PLTCS 6/28/22 M Apg 7/8 Wt 8#8       Today she is doing well without any chief complaint. Her lochia is light. She denies chest pain, shortness of breath, headache, lightheadedness, blurred vision and peripheral edema. She is breast feeding and she denies any signs or symptoms of mastitis. She has not yet ambulated. She is voiding with weaver in place. She currently denies S/S of postpartum depression. Flatus absent. Bowel movement absent. She is tolerating solids.     Vital Signs:  Vitals:    06/28/22 2002 06/28/22 2017 06/28/22 2115 06/29/22 0016   BP: 123/77 127/82 125/81 120/73   Pulse: 73 77 66 63   Resp: 18 17 18 16   Temp:   99.1 °F (37.3 °C) 98.8 °F (37.1 °C)   TempSrc:   Oral Oral   SpO2: 98% 97% 99% 98%        Urine Input & Output last 24hrs:     Intake/Output Summary (Last 24 hours) at 6/29/2022 0431  Last data filed at 6/29/2022 0016  Gross per 24 hour   Intake 1305 ml   Output 4005 ml   Net -2700 ml       Physical Exam:  General:  no apparent distress, alert and cooperative  Neurologic:  alert, oriented, normal speech, no focal findings or movement disorder noted  Lungs:  No increased work of breathing, good air exchange, clear to auscultation bilaterally, no crackles or wheezing  Heart:  Regular rate and rhythm, normal S1 and S2, no S3 or S4, and no murmur noted    Abdomen: abdomen soft, non-distended, non-tender, bowel sounds present  Fundus: non-tender, firm, below umbilicus  Incision: clean, dry and intact with dermabond in place  Extremities:  no calf tenderness, non edematous    Labs:  Lab Results   Component Value Date    WBC 11.7 (H) 2022    HGB 10.6 (L) 2022    HCT 33.5 (L) 2022    MCV 77.9 (L) 2022     2022       Assessment/Plan:  1. Liborio Nicholas is a  POD # 1 s/p PLTCS   - Doing well, VSS overnight   - Male infant in NICU, circumcision desired   - Encourage ambulation and use of incentive spirometer   - D/C weaver catheter and saline lock IV on POD #1    - CBC ordered this AM   - Motrin/Tylenol/Brook for pain  2. Rh positive/Rubella immune  3. Breast feeding   - Denies s/s mastitis  4. Anemia   - Hgb on admission of 10.6   - Asymptomatic, vitals stable, bleeding stable   - CBC ordered this AM  5. Elevated BP x1   - Isolated elevated BP this admission   - Denies s/s preE   - Will obtain PreE labs if patient symptomatic or meets criteria for gHTN  6. Heterozygous CHEK2 gene   - Pt at increased risk for colon and breast cancer   - Encourage close outpatient follow up  7. Hx Abnormal Pap   - ASCUS, HPV+ 2021   - Encourage outpatient follow up  8. Depression   - Symptoms stable on zoloft 100mg daily   - Discussed s/s postpartum depression  9. BMI 30  10. Continue post-op care. Counseling Completed:  Secondary Smoke risks and Sudden Infant Death Syndrome were reviewed with recommendations. Infant sleeping, \"back to sleep\" and avoidance of co-sleeping recommendations were reviewed. Signs and Symptoms of Post Partum Depression were reviewed. The patient is to call if any occur. Signs and symptoms of Mastitis were reviewed. The patient is to call if any occur for follow up.   Discharge instructions including pelvic rest, incision care, 15 lb weight restriction, no driving with pain medicine and office follow-up were reviewed with patient     Attending Physician: Dr. Ermias Kendrick DO  Ob/Gyn Resident  2022, 4:31 AM

## 2022-06-29 NOTE — DISCHARGE SUMMARY
[] [facility]        Outpatient Physical                Therapy       [address]       Phone: [phone]       Fax: [phone] [] [facility]       Promotion at [address] [facility]       Phone: [phone]       Fax: [phone] [] [facility]      [facility] Promotion     [address] [facility]     Phone: [phone]     Fax:  [phone]     Physical Therapy Discharge Note    Date: 2022      Patient: Krystle Aguilar  : 1995  MRN: [id number]    Physician: Stefany Khan- CNP                    Insurance: [employer](first) [employer] (second); 30 vs per year   Medical Diagnosis:   Z3A.21 (ICD-10-CM) - 21 weeks gestation of pregnancy   M54.41, G89.29, M54.42 (ICD-10-CM) - Chronic low back pain with bilateral sciatica, unspecified back pain laterality                                Rehab Codes: M54.49, M62.81, R29.3, R27.9  Onset Date: 10+ weeks ago, ~12/15/2021               Visit# / total visits:           Discharge Status:     Pt failed to make additional appointments for therapy. Pt. Is now discharged. Pt had her baby. Electronically signed by: Fabiana Chamberlain PT    If you have any questions or concerns, please don't hesitate to call.   Thank you for your referral.

## 2022-06-29 NOTE — CONSULTS
Initiation o f Electric Breast Pumping     Pumping Initiated at 2130    Initiated due to    [x]   Baby in NICU   []   Plans exclusive pumping   []   Infant weight loss(supplement)   []   Baby not latching well    Flange Size    Right:   Left:     [x]   24    [x]   24     []   27    []   27     []   30    []   30     []   36    []   36  Instructions   [x]   Verbal instructions on how to setup pump and how to use initiation phase   [x]   Written sheet\" How to keep your breast pump kit clean\"   [x]   Expectation sheet for Breastfeeding mothers with pumping log   [x]   Frequency of pumping   [x]   Collection,labeling and storage of colostrum and milk    Supplies Provided   [x]   Pump initiation kit   [x]   Cleaning supplies (basin and soap)   []   Additional flange size   [x]   Oral syringes/snappies   [x]   Patient labels     May need 27mm flanges, has a pump at home if needed. Encouraged to call out for assistance.    -

## 2022-06-29 NOTE — ANESTHESIA POSTPROCEDURE EVALUATION
POST- ANESTHESIA EVALUATION       Pt Name: Yvonne Romo  MRN: [id number]  YOB: 1995  Date of evaluation: 2022  Time:  6:10 AM      /71   Pulse 60   Temp 98.1 °F (36.7 °C) (Oral)   Resp 16   LMP 2021   SpO2 99%   Breastfeeding Unknown      Consciousness Level  Awake  Cardiopulmonary Status  Stable  Pain Adequately Treated YES  Nausea / Vomiting  NO  Adequate Hydration  YES  Anesthesia Related Complications NONE      Electronically signed by Burt Sheth MD on 2022 at 6:10 AM       Department of Anesthesiology  Postprocedure Note    Patient: Yvonne Romo  MRN: [id number]  YOB: 1995  Date of evaluation: 2022      Procedure Summary     Date: 22 Room / Location: [facility] L&D OR [address] [facility]    Anesthesia Start: 8557 Anesthesia Stop: 986    Procedures:        SECTION (N/A Abdomen)      Labor Analgesia Diagnosis:       Elective surgery      (39 6/7 weeks elective primary c/s for maternal request)    Surgeons: Alf Tucker MD Responsible Provider: Burt Sheth MD    Anesthesia Type: epidural ASA Status: 2          Anesthesia Type: No value filed.     Sharonda Phase I: Sharonda Score: 10    Sharonda Phase II:        Anesthesia Post Evaluation right foot wound Osteomyelitis R great toe wound evaluation for picc placement Rehab evaluation

## 2022-06-29 NOTE — PROGRESS NOTES
Patient transferred to room 742 via stretcher from L&D. Patient oriented to room and call light, call light placed at bedside. Baby boy, Mikhail Dial in NICU. Shantell Diez at bedside reviewing pumping with patient. RN reviewed birth certificate and other paperwork with patient and s/o, Phan Cedeno.

## 2022-06-29 NOTE — PLAN OF CARE
Problem: Pain  Goal: Verbalizes/displays adequate comfort level or baseline comfort level  Outcome: Progressing     Problem: Vaginal Birth or  Section  Goal: Fetal and maternal status remain reassuring during the birth process  Description:  Birth OB-Pregnancy care plan goal which identifies if the fetal and maternal status remain reassuring during the birth process  Outcome: Progressing Abdominal Pain, N/V/D (Pediatric)

## 2022-06-30 PROCEDURE — 6370000000 HC RX 637 (ALT 250 FOR IP)

## 2022-06-30 PROCEDURE — 1220000000 HC SEMI PRIVATE OB R&B

## 2022-06-30 RX ORDER — METOCLOPRAMIDE 10 MG/1
10 TABLET ORAL ONCE
Status: DISCONTINUED | OUTPATIENT
Start: 2022-06-30 | End: 2022-07-02 | Stop reason: HOSPADM

## 2022-06-30 RX ORDER — DIPHENHYDRAMINE HCL 25 MG
25 TABLET ORAL ONCE
Status: DISCONTINUED | OUTPATIENT
Start: 2022-06-30 | End: 2022-07-02 | Stop reason: HOSPADM

## 2022-06-30 RX ADMIN — DOCUSATE SODIUM 100 MG: 100 CAPSULE ORAL at 08:25

## 2022-06-30 RX ADMIN — IBUPROFEN 600 MG: 600 TABLET, FILM COATED ORAL at 14:25

## 2022-06-30 RX ADMIN — DOCUSATE SODIUM 100 MG: 100 CAPSULE ORAL at 20:32

## 2022-06-30 RX ADMIN — IBUPROFEN 600 MG: 600 TABLET, FILM COATED ORAL at 20:32

## 2022-06-30 RX ADMIN — IBUPROFEN 600 MG: 600 TABLET, FILM COATED ORAL at 08:25

## 2022-06-30 RX ADMIN — IBUPROFEN 600 MG: 600 TABLET, FILM COATED ORAL at 00:57

## 2022-06-30 RX ADMIN — Medication 1 TABLET: at 08:26

## 2022-06-30 RX ADMIN — SERTRALINE 100 MG: 50 TABLET, FILM COATED ORAL at 08:25

## 2022-06-30 RX ADMIN — DOCUSATE SODIUM 100 MG: 100 CAPSULE ORAL at 00:57

## 2022-06-30 ASSESSMENT — PAIN DESCRIPTION - LOCATION
LOCATION: ABDOMEN
LOCATION: INCISION
LOCATION: ABDOMEN
LOCATION: INCISION

## 2022-06-30 ASSESSMENT — PAIN DESCRIPTION - ORIENTATION
ORIENTATION: LOWER
ORIENTATION: LOWER

## 2022-06-30 ASSESSMENT — PAIN DESCRIPTION - PAIN TYPE
TYPE: SURGICAL PAIN
TYPE: SURGICAL PAIN

## 2022-06-30 ASSESSMENT — PAIN SCALES - GENERAL
PAINLEVEL_OUTOF10: 3
PAINLEVEL_OUTOF10: 4
PAINLEVEL_OUTOF10: 2
PAINLEVEL_OUTOF10: 4
PAINLEVEL_OUTOF10: 6

## 2022-06-30 ASSESSMENT — PAIN DESCRIPTION - FREQUENCY
FREQUENCY: INTERMITTENT
FREQUENCY: CONTINUOUS

## 2022-06-30 ASSESSMENT — PAIN DESCRIPTION - ONSET: ONSET: ON-GOING

## 2022-06-30 ASSESSMENT — PAIN DESCRIPTION - DESCRIPTORS
DESCRIPTORS: SORE;TENDER
DESCRIPTORS: ACHING
DESCRIPTORS: ACHING

## 2022-06-30 ASSESSMENT — PAIN - FUNCTIONAL ASSESSMENT: PAIN_FUNCTIONAL_ASSESSMENT: ACTIVITIES ARE NOT PREVENTED

## 2022-06-30 NOTE — LACTATION NOTE
To nicu to assist with feed, pt had baby in cross cradle on right side with good technique and writer noted nice strong jaw movement with a good sucking pattern. Stated he just nursed on left side and did really well. Was able to sustain a good latch with a strong suck. Reviewed signs of milk transfer and noted audible swallows as well. Encouraged pt to call lactation for outpatient appointment if needed.

## 2022-06-30 NOTE — FLOWSHEET NOTE
Pt stated that she has had a continuous dull headache. Pt denies spots in her vision, epigastric pain, and N/V. Writer informed pt to call out if headache becomes worse or she develops any other symptoms. Resident notified.

## 2022-06-30 NOTE — PLAN OF CARE
Problem: Pain  Goal: Verbalizes/displays adequate comfort level or baseline comfort level  2022 by Trupti Alvarez RN  Outcome: Progressing  2022 by Di York RN  Outcome: Progressing     Problem: Vaginal Birth or  Section  Goal: Fetal and maternal status remain reassuring during the birth process  Description:  Birth OB-Pregnancy care plan goal which identifies if the fetal and maternal status remain reassuring during the birth process  2022 by Trupti Alvarez RN  Outcome: Progressing  2022 by Di York RN  Outcome: Progressing

## 2022-06-30 NOTE — LACTATION NOTE
To nicu to assist with latching, refined position. Attempted for 15min, baby only open wide enough a couple of times, and sucked a couple of times as well. Baby is on oxygen and per RN respiratory rate has been high, so after 15 minutes plan to pump and supplement.

## 2022-06-30 NOTE — PROGRESS NOTES
POST OPERATIVE DAY # 2    Cipriano Moe is a 32 y.o. female   This patient was seen and examined today. PLTCS on 6/28/22    Her pregnancy was complicated by:   Patient Active Problem List   Diagnosis    Thoracic back sprain    ASCUS with positive high risk HPV cervical    Depression    Pregnancy through Sperm donor    High risk for colon cancer    Increased risk of breast cancer    COVID-19 vaccine series completed    Need for Tdap vaccination    39 weeks gestation of pregnancy    PLTCS 6/28/22 M Apg 7/8 Wt 8#8       Today she is doing well without any chief complaint. Her lochia is light. She denies chest pain, shortness of breath, headache, lightheadedness, blurred vision and peripheral edema. She is breast feeding and she denies any signs or symptoms of mastitis. She is ambulating well. She is voiding without difficulty. She currently denies S/S of postpartum depression. Flatus present. Bowel movement absent. She is tolerating solids.     Vital Signs:  Vitals:    06/29/22 0433 06/29/22 0800 06/29/22 1200 06/29/22 2034   BP: 113/71 (!) 108/59 (!) 101/57 114/66   Pulse: 60 55 66 64   Resp: 16 16 16 16   Temp: 98.1 °F (36.7 °C) 97.9 °F (36.6 °C) 98.2 °F (36.8 °C) 98.7 °F (37.1 °C)   TempSrc: Oral Oral Oral Oral   SpO2: 99% 98%  99%        Urine Input & Output last 24hrs:     Intake/Output Summary (Last 24 hours) at 6/30/2022 0422  Last data filed at 6/29/2022 1045  Gross per 24 hour   Intake --   Output 1050 ml   Net -1050 ml       Physical Exam:  General:  no apparent distress, alert and cooperative  Neurologic:  alert, oriented, normal speech, no focal findings or movement disorder noted  Lungs:  No increased work of breathing, good air exchange, clear to auscultation bilaterally, no crackles or wheezing  Heart:  Regular rate and rhythm, normal S1 and S2, no S3 or S4, and no murmur noted    Abdomen: abdomen soft, non-distended, non-tender, bowel sounds present  Fundus: non-tender, firm, below umbilicus  Incision: clean, dry and intact with dermabond in place  Extremities:  no calf tenderness, non edematous    Labs:  Lab Results   Component Value Date    WBC 19.4 (H) 2022    HGB 8.6 (L) 2022    HCT 28.1 (L) 2022    MCV 80.3 (L) 2022     2022       Assessment/Plan:  1. Ariane Munson is a  POD # 2 s/p PLTCS   - Doing well, VSS overnight              - Male infant in NICU, circumcision desired              - Encourage ambulation and use of incentive spirometer              - S/p weaver catheter and saline lock IV              - Hgb trended during admission, 10.6>8.6 postoperatively              - Motrin/Tylenol/Brook for pain   - Received Methergine and TXA x1 intraop  2. Rh positive/Rubella immune  3. Breast feeding              - Denies s/s mastitis  4. Anemia              - Hgb on admission of 10.6, decreased to 8.6 postoperatively              - Asymptomatic, vitals stable, bleeding stable  5. Elevated BP x1              - Isolated elevated BP this admission              - Denies s/s preE              - Will obtain PreE labs if patient symptomatic or meets criteria for gHTN  6. Heterozygous CHEK2 gene              - Pt at increased risk for colon and breast cancer              - Encourage close outpatient follow up  7. Hx Abnormal Pap              - ASCUS, HPV+ 2021              - Encourage outpatient follow up  8. Depression              - Symptoms stable on zoloft 100mg daily              - Discussed s/s postpartum depression  9. BMI 30  10. Continue post-op care. Counseling Completed:  Secondary Smoke risks and Sudden Infant Death Syndrome were reviewed with recommendations. Infant sleeping, \"back to sleep\" and avoidance of co-sleeping recommendations were reviewed. Signs and Symptoms of Post Partum Depression were reviewed. The patient is to call if any occur. Signs and symptoms of Mastitis were reviewed.  The patient is to call if any occur for follow up.  Discharge instructions including pelvic rest, incision care, 15 lb weight restriction, no driving with pain medicine and office follow-up were reviewed with patient     Attending Physician: Dr. Kathi Caceres DO  Ob/Gyn Resident  6/30/2022, 4:22 AM

## 2022-06-30 NOTE — LACTATION NOTE
RN in room to evaluate pumping. Patient states pumping is comfortable at this time. Pumping 8-10mLs per session. Reminded patient that this is normal and to be consistent with her pumping. Lanolin and hydrogel pads given, RN educated patient on both.

## 2022-07-01 PROCEDURE — 6370000000 HC RX 637 (ALT 250 FOR IP)

## 2022-07-01 PROCEDURE — 1220000000 HC SEMI PRIVATE OB R&B

## 2022-07-01 RX ADMIN — IBUPROFEN 600 MG: 600 TABLET, FILM COATED ORAL at 09:05

## 2022-07-01 RX ADMIN — IBUPROFEN 600 MG: 600 TABLET, FILM COATED ORAL at 17:54

## 2022-07-01 RX ADMIN — DOCUSATE SODIUM 100 MG: 100 CAPSULE ORAL at 09:06

## 2022-07-01 RX ADMIN — DOCUSATE SODIUM 100 MG: 100 CAPSULE ORAL at 20:34

## 2022-07-01 RX ADMIN — Medication 1 TABLET: at 09:06

## 2022-07-01 RX ADMIN — SERTRALINE 100 MG: 50 TABLET, FILM COATED ORAL at 09:06

## 2022-07-01 ASSESSMENT — PAIN DESCRIPTION - LOCATION: LOCATION: ABDOMEN

## 2022-07-01 ASSESSMENT — PAIN SCALES - GENERAL
PAINLEVEL_OUTOF10: 4
PAINLEVEL_OUTOF10: 3

## 2022-07-01 ASSESSMENT — PAIN DESCRIPTION - PAIN TYPE: TYPE: SURGICAL PAIN

## 2022-07-01 ASSESSMENT — PAIN DESCRIPTION - DESCRIPTORS: DESCRIPTORS: CRAMPING;DISCOMFORT;ACHING

## 2022-07-01 NOTE — PROGRESS NOTES
Social Work    Met with patient at bedside to offer any assist or support since baby is in the NICU. Patient very pleasant. She reported that this is her 1st child Zenon Kohler and that he may go home on Sunday. Resides with her wife. Has all needed baby items. Has a crib and bassinet for safe sleep. Not linked with any outpatient programs or assistance. Discussed [facility] and [facility] and she declined. Denied any signs or symptoms of anxiety or depression. PCP will be Dr. Darion Valles office. Informed patient if any needs arise to reach out to SW.

## 2022-07-01 NOTE — PROGRESS NOTES
POST OPERATIVE DAY # 3    Catalino Mera is a 32 y.o. female   This patient was seen and examined today. PLTCS on 6/28/22    Her pregnancy was complicated by:   Patient Active Problem List   Diagnosis    Thoracic back sprain    ASCUS with positive high risk HPV cervical    Depression    Pregnancy through Sperm donor    High risk for colon cancer    Increased risk of breast cancer    COVID-19 vaccine series completed    Need for Tdap vaccination    39 weeks gestation of pregnancy    PLTCS 6/28/22 M Apg 7/8 Wt 8#8       Today she is doing well without any chief complaint. Her lochia is light. She denies chest pain, shortness of breath, headache, lightheadedness, blurred vision and peripheral edema. She is breast feeding and she denies any signs or symptoms of mastitis. She is ambulating well. She is voiding without difficulty. She currently denies S/S of postpartum depression. Flatus present. Bowel movement absent. She is tolerating solids.     Vital Signs:  Vitals:    06/30/22 0543 06/30/22 0834 06/30/22 1700 06/30/22 2024   BP: 103/73 109/65 134/74 123/80   Pulse: 63 65 75 75   Resp: 16 18 16 16   Temp: 98.5 °F (36.9 °C) 97.5 °F (36.4 °C) 98.1 °F (36.7 °C) 98.5 °F (36.9 °C)   TempSrc: Oral Oral Oral Oral   SpO2: 98% 97%  98%        Physical Exam:  General:  no apparent distress, alert and cooperative  Neurologic:  alert, oriented, normal speech, no focal findings or movement disorder noted  Lungs:  No increased work of breathing, good air exchange, clear to auscultation bilaterally, no crackles or wheezing  Heart:  Regular rate and rhythm, normal S1 and S2, no S3 or S4, and no murmur noted    Abdomen: abdomen soft, non-distended, non-tender, bowel sounds present  Fundus: non-tender, firm, below umbilicus  Incision: clean, dry and intact with dermabond in place  Extremities:  no calf tenderness, non edematous    Labs:  Lab Results   Component Value Date    WBC 19.4 (H) 06/29/2022    HGB 8.6 (L) 06/29/2022 HCT 28.1 (L) 2022    MCV 80.3 (L) 2022     2022       Assessment/Plan:  1. Chet Westfall is a  POD # 3 s/p PLTCS   - Doing well, VSS overnight              - Male infant in NICU, circumcision desired              - Encourage ambulation and use of incentive spirometer              - S/p weaver catheter and saline lock IV              - Hgb trended during admission, 10.6>8.6 postoperatively              - Motrin/Tylenol/Brook for pain   - Received Methergine and TXA x1 intraop  2. Rh positive/Rubella immune  3. Breast feeding              - Denies s/s mastitis  4. Anemia              - Hgb on admission of 10.6, decreased to 8.6 postoperatively              - Asymptomatic, vitals stable, bleeding stable  5. Elevated BP x1              - Isolated elevated BP this admission              - Denies s/s preE              - Will obtain PreE labs if patient symptomatic or meets criteria for gHTN  6. Heterozygous CHEK2 gene              - Pt at increased risk for colon and breast cancer              - Encourage close outpatient follow up  7. Hx Abnormal Pap              - ASCUS, HPV+ 2021              - Encourage outpatient follow up  8. Depression              - Symptoms stable on zoloft 100mg daily              - Discussed s/s postpartum depression  9. BMI 30  10. Continue post-op care. Counseling Completed:  Secondary Smoke risks and Sudden Infant Death Syndrome were reviewed with recommendations. Infant sleeping, \"back to sleep\" and avoidance of co-sleeping recommendations were reviewed. Signs and Symptoms of Post Partum Depression were reviewed. The patient is to call if any occur. Signs and symptoms of Mastitis were reviewed. The patient is to call if any occur for follow up.   Discharge instructions including pelvic rest, incision care, 15 lb weight restriction, no driving with pain medicine and office follow-up were reviewed with patient     Attending Physician:  Mirtha García DO  Ob/Gyn Resident  7/1/2022, 1:27 AM         Attending Physician Statement  I have discussed the care of Samy Rizo, including pertinent history and exam findings,  with the resident. I have reviewed the key elements of all parts of the encounter with the resident. I agree with the assessment, plan and orders as documented by the resident.   (GE Modifier)    Electronically signed by Elida Joseph MD at 8:09 AM 07/01/22

## 2022-07-02 VITALS
DIASTOLIC BLOOD PRESSURE: 88 MMHG | OXYGEN SATURATION: 98 % | TEMPERATURE: 98.4 F | SYSTOLIC BLOOD PRESSURE: 128 MMHG | RESPIRATION RATE: 16 BRPM | HEART RATE: 74 BPM

## 2022-07-02 PROCEDURE — 6370000000 HC RX 637 (ALT 250 FOR IP)

## 2022-07-02 RX ADMIN — DOCUSATE SODIUM 100 MG: 100 CAPSULE ORAL at 08:50

## 2022-07-02 RX ADMIN — SERTRALINE 100 MG: 50 TABLET, FILM COATED ORAL at 08:50

## 2022-07-02 RX ADMIN — IBUPROFEN 600 MG: 600 TABLET, FILM COATED ORAL at 16:40

## 2022-07-02 RX ADMIN — IBUPROFEN 600 MG: 600 TABLET, FILM COATED ORAL at 01:28

## 2022-07-02 RX ADMIN — Medication 1 TABLET: at 08:50

## 2022-07-02 RX ADMIN — IBUPROFEN 600 MG: 600 TABLET, FILM COATED ORAL at 08:50

## 2022-07-02 ASSESSMENT — PAIN DESCRIPTION - PAIN TYPE: TYPE: ACUTE PAIN

## 2022-07-02 ASSESSMENT — PAIN SCALES - GENERAL
PAINLEVEL_OUTOF10: 2
PAINLEVEL_OUTOF10: 2
PAINLEVEL_OUTOF10: 3
PAINLEVEL_OUTOF10: 2

## 2022-07-02 ASSESSMENT — PAIN DESCRIPTION - DESCRIPTORS: DESCRIPTORS: ACHING;DISCOMFORT

## 2022-07-02 ASSESSMENT — PAIN DESCRIPTION - LOCATION: LOCATION: NECK

## 2022-07-02 NOTE — PROGRESS NOTES
POST OPERATIVE DAY # 4    Michael Ford is a 32 y.o. female   This patient was seen and examined today. Her pregnancy was complicated by:   Patient Active Problem List   Diagnosis    Thoracic back sprain    ASCUS with positive high risk HPV cervical    Depression    Pregnancy through Sperm donor    High risk for colon cancer    Increased risk of breast cancer    COVID-19 vaccine series completed    Need for Tdap vaccination    39 weeks gestation of pregnancy    PLTCS 6/28/22 M Apg 7/8 Wt 8#8       Today she is doing well without any chief complaint. Her lochia is light. She denies chest pain, shortness of breath, headache, lightheadedness, and blurred vision. She is  breast feeding and she denies any signs or symptoms of mastitis. She is ambulating well. She is voiding without difficulty. She currently denies S/S of postpartum depression. Flatus present. Bowel movement absent. She is tolerating solids.     Vital Signs:  Vitals:    07/01/22 0900 07/01/22 1430 07/01/22 2030 07/02/22 0120   BP: 115/76 121/83 129/80 128/83   Pulse: 83 88 79 68   Resp: 16 16 16 16   Temp: 98.1 °F (36.7 °C) 98.6 °F (37 °C) 98.2 °F (36.8 °C) 98.6 °F (37 °C)   TempSrc: Oral Oral Oral Oral   SpO2: 98% 98% 98% 98%         Urine Input & Output last 24hrs:   No intake or output data in the 24 hours ending 07/02/22 0701    Physical Exam:  General:  no apparent distress, alert, and cooperative  Neurologic:  alert, oriented, normal speech, no focal findings or movement disorder noted  Lungs:  No increased work of breathing, good air exchange, clear to auscultation bilaterally, no crackles or wheezing  Heart:  regular rate and rhythm    Abdomen: abdomen soft, non-distended, non-tender  Fundus: non-tender, normal size, firm, below umbilicus  Incision: clean, dry and intact  Extremities:  no calf tenderness, non edematous    Labs:  Lab Results   Component Value Date    WBC 19.4 (H) 06/29/2022    HGB 8.6 (L) 06/29/2022    HCT 28.1 (L) 2022    MCV 80.3 (L) 2022     2022       Assessment/Plan:  1. Marcus Isabel is a  POD # 4 s/p PLTCS   - Doing well, VSS    - male infant in NICU, circumcision desired   - Encourage ambulation and use of incentive spirometer   - CBC with Hg 8.6 post operatively    - Continue post op care  2. Rh positive/Rubella immune   - MMR and rhogam not indicated   3. Breast feeding    - Denies s/s of mastitis  4. Anemia   - Hg 10.6 on admission   - Hg of 8.6 post operatively  5. Elv BP x 1   - Patient has had one elevated blood pressure since arrival   - Does not meet criteria for anything at this time   - Denies s/s of pre E  6. Depression   - Zoloft 100 mg daily   - Denies SI/HI  7. BMI 30      Counseling Completed:  Secondary Smoke risks and Sudden Infant Death Syndrome were reviewed with recommendations. Infant sleeping, \"back to sleep\" and avoidance of co-sleeping recommendations were reviewed. Signs and Symptoms of Post Partum Depression were reviewed. The patient is to call if any occur. Signs and symptoms of Mastitis were reviewed. The patient is to call if any occur for follow up.   Discharge instructions including pelvic rest, incision care, 15 lb weight restriction, no driving with pain medicine and office follow-up were reviewed with patient     Attending Physician: Dr. Dominique Caldwell DO  Ob/Gyn Resident  2022, 7:01 AM

## 2022-07-02 NOTE — PLAN OF CARE
Problem: Pain  Goal: Verbalizes/displays adequate comfort level or baseline comfort level  Outcome: Completed     Problem: Vaginal Birth or  Section  Goal: Fetal and maternal status remain reassuring during the birth process  Description:  Birth OB-Pregnancy care plan goal which identifies if the fetal and maternal status remain reassuring during the birth process  Outcome: Completed

## 2022-07-02 NOTE — FLOWSHEET NOTE
I have reviewed all [facility] Post-Birth Warning Signs and essential teaching points for pulmonary embolism, cardiac disease, hypertensive disorders of pregnancy, obstetric hemorrhage, venous thromboembolism, infection, and postpartum depression with the patient and spouse (support person) . I have informed the patient on when to call their healthcare provider and when to call 911. I have discussed with the patient  the importance of scheduling a follow-up visit with their physician, nurse practitioner or midwife and provided them with correct contact information for appointment. I have provided the patient with a copy of the \"Save Your Life\" handout. The patient has acknowledged receiving and understanding this education with her signature.

## 2022-07-03 NOTE — PROGRESS NOTES
CLINICAL PHARMACY NOTE: MEDS TO BEDS    Total # of Prescriptions Filled: 3   The following medications were delivered to the patient:  · ferosul  · Ibuprofen 600  · Docusate 100    Additional Documentation:  Pt paid with $5 cash

## 2022-07-05 PROBLEM — Z3A.39 39 WEEKS GESTATION OF PREGNANCY: Status: RESOLVED | Noted: 2022-06-28 | Resolved: 2022-07-05

## 2022-07-06 ENCOUNTER — POSTPARTUM VISIT (OUTPATIENT)
Dept: OBGYN CLINIC | Age: 27
End: 2022-07-06

## 2022-07-06 VITALS
WEIGHT: 142.6 LBS | HEART RATE: 84 BPM | HEIGHT: 62 IN | DIASTOLIC BLOOD PRESSURE: 80 MMHG | BODY MASS INDEX: 26.24 KG/M2 | SYSTOLIC BLOOD PRESSURE: 123 MMHG

## 2022-07-06 PROCEDURE — 99024 POSTOP FOLLOW-UP VISIT: CPT | Performed by: OBSTETRICS & GYNECOLOGY

## 2022-08-09 ENCOUNTER — PATIENT MESSAGE (OUTPATIENT)
Dept: OBGYN CLINIC | Age: 27
End: 2022-08-09

## 2022-08-09 NOTE — TELEPHONE ENCOUNTER
From: Brooks Romo  To: Dr. Lorraine Vo: 8/9/2022 7:22 AM EDT  Subject: zoloft     Hello! I was wondering if we could increase my Zoloft dose! Let me know what you think!

## 2022-08-10 ENCOUNTER — POSTPARTUM VISIT (OUTPATIENT)
Dept: OBGYN CLINIC | Age: 27
End: 2022-08-10

## 2022-08-10 VITALS
DIASTOLIC BLOOD PRESSURE: 82 MMHG | SYSTOLIC BLOOD PRESSURE: 110 MMHG | WEIGHT: 144 LBS | BODY MASS INDEX: 26.5 KG/M2 | HEIGHT: 62 IN

## 2022-08-10 PROCEDURE — 0503F POSTPARTUM CARE VISIT: CPT | Performed by: STUDENT IN AN ORGANIZED HEALTH CARE EDUCATION/TRAINING PROGRAM

## 2022-08-10 RX ORDER — SERTRALINE HYDROCHLORIDE 100 MG/1
200 TABLET, FILM COATED ORAL DAILY
Qty: 120 TABLET | Refills: 6 | Status: SHIPPED | OUTPATIENT
Start: 2022-08-10

## 2022-08-10 NOTE — PROGRESS NOTES
Postpartum Visit    Rodrick Ponce is a 32 y.o. female , 6 weeks Post Partum s/p primary  section, low transverse incision on 22    The patient was seen. She has no chief complaint today. Her pregnancy was complicated by CPD. She is  breast feeding and denies signs or symptoms of mastitis. The patient completed the E.P.D.S. Post Partum Depression Evaluation form and scored 22. She does have signs or symptoms of post partum depression. She denies any suicidal thoughts with a plan, intent to harm others, and delusional ideas. She does admit to having good home support. Patient would like to increase her Zoloft to 200 mg. She also declines any further resources including therapy referral.  Today her lochia is light she denies any dizziness or shortness of breath. Her bowels are regular and she denies any urinary tract symptomology. She is using nothing for family planning and for STD prevention as she is in a same sex marriage. OB History    Para Term  AB Living   1 1 1 0 0 1   SAB IAB Ectopic Molar Multiple Live Births   0 0 0 0 0 1     Patient Active Problem List   Diagnosis    Thoracic back sprain    ASCUS with positive high risk HPV cervical    Depression    Pregnancy through Sperm donor    High risk for colon cancer    Increased risk of breast cancer    COVID-19 vaccine series completed    Need for Tdap vaccination    PLTCS 22 M Apg  Wt 8#8       Vitals:   Blood pressure 110/82, height 5' 2\" (1.575 m), weight 144 lb (65.3 kg), last menstrual period 2021, currently breastfeeding. Physical Exam:  General:  no apparent distress, alert and cooperative  Lungs:  No increased work of breathing, good air exchange, clear to auscultation bilaterally, no crackles or wheezing  Heart:  regular rate and rhythm and no murmur    Abdomen: Abdomen soft, non-tender.  BS normal. No masses,  No organomegaly  Fundus: non-tender, normal size, firm, below umbilicus  Perineum: not inspected  Incision: clean, dry, and intact  Extremities:  no calf tenderness, non edematous    Assessment:  Sheri Engel is a 32 y.o. female , 6 weeks Post Partum s/p primary  section, low transverse incision on 22   - Stable, discontinue routine post partum care   - Upped Zoloft to 200 mg QD. Denies SI/HI.   - RTO in 2 weeks for follow up. Patient Active Problem List    Diagnosis Date Noted    PLTCS 22 M Apg  Wt 8#8 2022     Priority: Medium    Need for Tdap vaccination 2022      COVID-19 vaccine series completed 2022     Booster 3/18      High risk for colon cancer 2022     Start at 36 and every 5 years per genetic testing      Increased risk of breast cancer 2022     Patient heterozygous for CHEK2 gene  Mammogram and MRI yearly at 40 per genetic testing note. Pregnancy through Sperm donor 2022    Depression 2021    ASCUS with positive high risk HPV cervical 2021     ASCUS + other high risk HPV 8/10/2021      Thoracic back sprain 2021     Return in about 2 weeks (around 2022) for PP Visit.     Counseling Completed:  Family planning counseling and STD counseling discussed  Discontinue with postpartum restrictions  Can resume heavy lifting and DO [facility]   8/10/2022, 4:03 PM

## 2022-08-14 RX ORDER — SERTRALINE HYDROCHLORIDE 100 MG/1
150 TABLET, FILM COATED ORAL DAILY
Qty: 45 TABLET | Refills: 2 | Status: SHIPPED | OUTPATIENT
Start: 2022-08-14

## 2022-09-08 ENCOUNTER — POSTPARTUM VISIT (OUTPATIENT)
Dept: OBGYN CLINIC | Age: 27
End: 2022-09-08

## 2022-09-08 VITALS
DIASTOLIC BLOOD PRESSURE: 80 MMHG | WEIGHT: 145 LBS | BODY MASS INDEX: 26.68 KG/M2 | HEIGHT: 62 IN | SYSTOLIC BLOOD PRESSURE: 124 MMHG

## 2022-09-08 DIAGNOSIS — F32.A DEPRESSION, UNSPECIFIED DEPRESSION TYPE: ICD-10-CM

## 2022-09-08 PROCEDURE — 0503F POSTPARTUM CARE VISIT: CPT | Performed by: STUDENT IN AN ORGANIZED HEALTH CARE EDUCATION/TRAINING PROGRAM

## 2022-09-08 NOTE — PROGRESS NOTES
Postpartum Visit    Chet Westfall is a 32 y.o. female , 9 weeks Post Partum s/p PLTCS on 22    The patient was seen. Her pregnancy was complicated by IUI pregnancy. She is  breast feeding and denies signs or symptoms of mastitis. The patient completed the E.P.D.S. Post Partum Depression Evaluation form and scored 4. She does not have signs or symptoms of post partum depression. She denies any suicidal thoughts with a plan, intent to harm others, and delusional ideas. She does admit to having good home support. Her depression symptoms are much improved with Zoloft 200 mg. Today her lochia is light she denies any dizziness or shortness of breath. Her bowels are regular and she denies any urinary tract symptomology. OB History    Para Term  AB Living   1 1 1 0 0 1   SAB IAB Ectopic Molar Multiple Live Births   0 0 0 0 0 1     Patient Active Problem List   Diagnosis    Thoracic back sprain    ASCUS with positive high risk HPV cervical    Depression    Pregnancy through Sperm donor    High risk for colon cancer    Increased risk of breast cancer    COVID-19 vaccine series completed    Need for Tdap vaccination    PLTCS 22 M Apg 7/8 Wt 8#8       Vitals:   Blood pressure 124/80, height 5' 2\" (1.575 m), weight 145 lb (65.8 kg), last menstrual period 2021, currently breastfeeding. Physical Exam:  General:  no apparent distress, alert and cooperative  Lungs:  No increased work of breathing, good air exchange, clear to auscultation bilaterally, no crackles or wheezing  Heart:  regular rate and rhythm and no murmur    Abdomen: Abdomen soft, non-tender.  BS normal. No masses,  No organomegaly  Fundus: non-tender, normal size, firm, below umbilicus  Extremities:  no calf tenderness, non edematous    Assessment:  Chet Westfall is a 32 y.o. female , 9 weeks Post Partum s/p PLTCS on 22   - Stable, discontinue routine post partum care   - EPDS score much improved with 200 mg Zoloft    Patient Active Problem List    Diagnosis Date Noted    PLTCS 6/28/22 M Apg 7/8 Wt 8#8 06/28/2022     Priority: Medium    Need for Tdap vaccination 04/05/2022 4/5/22      COVID-19 vaccine series completed 03/22/2022     Booster 3/18      High risk for colon cancer 02/07/2022     Start at 36 and every 5 years per genetic testing      Increased risk of breast cancer 02/07/2022     Patient heterozygous for CHEK2 gene  Mammogram and MRI yearly at 40 per genetic testing note.       Pregnancy through Sperm donor 01/26/2022    Depression 11/30/2021    ASCUS with positive high risk HPV cervical 08/24/2021     ASCUS + other high risk HPV 8/10/2021      Thoracic back sprain 06/24/2021     Return in about 3 months (around 12/8/2022) for Annual.    Counseling Completed:  Family planning counseling and STD counseling discussed  Discontinue with postpartum restrictions  Can resume heavy lifting and Romy Espinosa [address], [facility]   9/8/2022, 3:43 PM

## 2023-03-03 NOTE — PROGRESS NOTES
Case Management Assessment  Initial Evaluation    Date/Time of Evaluation: 3/3/2023 4:33 PM  Assessment Completed by: DAVINA Osullivan, DOMINIQUE    If patient is discharged prior to next notation, then this note serves as note for discharge by case management. Patient Name: Cece Grimaldo                   YOB: 1958  Diagnosis: Hypoxia [R09.02]  Acute combined systolic and diastolic CHF, NYHA class 1 ([address] [I50.41]  Acute on chronic congestive heart failure, unspecified heart failure type ([address] [I50.9]                   Date / Time: 3/2/2023  2:14 PM    Patient Admission Status: Inpatient   Readmission Risk (Low < 19, Mod (19-27), High > 27): Readmission Risk Score: 29.8    Current PCP: No primary care provider on file. PCP verified by CM? No (Patient does not have a PCP. [phone] number was given and encouraged pt to call and set PCP up.)    Chart Reviewed: Yes      History Provided by: Patient  Patient Orientation: Alert and Oriented    Patient Cognition: Alert    Hospitalization in the last 30 days (Readmission):  Yes    If yes, Readmission Assessment in CM Navigator will be completed. Advance Directives:      Code Status: Full Code   Patient's Primary Decision Maker is:        Discharge Planning:    Patient lives with: Friends Type of Home: House  Primary Care Giver: Self  Patient Support Systems include: Family Members, Friends/Neighbors   Current Financial resources: Medicaid  Current community resources: None  Current services prior to admission: None            Current DME:              Type of Home Care services:  None    ADLS  Prior functional level: Independent in ADLs/IADLs  Current functional level: Other (see comment) (Patient feels weak at times but still able to ambulate independently.)    PT AM-PAC:   /24  OT AM-PAC:   /24    Family can provide assistance at DC:  Yes  Would you like Case Management to discuss the discharge plan with any other family members/significant others, Labor Progress Note    Alexander Beckford is a 32 y.o. female  at 37w11d  The patient was seen and examined. Her pain is well controlled. She reports fetal movement is present, complains of contractions, denies loss of fluid, denies vaginal bleeding.        Vital Signs:  Vitals:    22 0315   BP: 135/83   Pulse: 75   Resp: 16   Temp: 97.5 °F (36.4 °C)   TempSrc: Oral   SpO2: 98%     FHT: 150, moderate variability, accelerations absent, decelerations absent  Contractions: irregular, every 2-6 minutes    Chaperone for Intimate Exam: Chaperone was present for entire exam, Chaperone Name: Josemanuel Wilkerson RN  Cervical Exam: 1 cm dilated, 50 effaced, -2 station  Pitocin: @ 0 mu/min    Membranes: Intact  Scalp Electrode in place: absent  Intrauterine Pressure Catheter in Place: absent    Interventions: SVE, transcervical weaver balloon    Assessment/Plan:  Alexander Beckford is a 32 y.o. female  at 37w11d admitted for IOL 2/2 Cat 2 tracing   - GBS negative, No indication for GBS prophylaxis   - VSS, afebrile   - cEFM/TOCO   - Weaver balloon in place, out @ 1700   - Low dose pitocin ordered per protocol   - Continue to monitor closely    Attending updated and in agreement with plan    Maxine Gray MD  Ob/Gyn Resident  2022, 5:15 AM and if so, who? No  Plans to Return to Present Housing:    Other Identified Issues/Barriers to RETURNING to current housing: None  Potential Assistance needed at discharge: Other (Comment) (Patient would benefit from PCP set up. Provided [phone] number. Pt may also benefit from home RN or detailed explanation at discharge about home medications.)            Potential DME:    Patient expects to discharge to: [address] [place] [address] for transportation at discharge: Friends    Financial    Payor: Parker Richey / Plan: Joshua Miller / Product Type: *No Product type* /     Potential assistance Purchasing Medications: No  Meds-to-Beds request:        [facility], [address]  [address] [address]  Phone: [phone] Fax: [phone]      Notes:    Factors facilitating achievement of predicted outcomes: Friend support, Motivated, and Cooperative    Barriers to discharge: None    Additional Case Management Notes:  Provided patient with PCP phone number, [phone], and encouraged to call and set up PCP. Patient may benefit from home RN (if qualifies) or detailed explanation of meds at discharge. No other needs identified from case management.     The Plan for Transition of Care is related to the following treatment goals of Hypoxia [R09.02]  Acute combined systolic and diastolic CHF, NYHA class 1 (HCC) [I50.41]  Acute on chronic congestive heart failure, unspecified heart failure type ([facility] [address] [facility] [I50.9]      DAVINA Garcia, DOMINIQUE  Case Management Department    Electronically signed by DAVINA Garcia LSW on 3/3/2023 at 4:56 PM
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
## 2023-03-13 ENCOUNTER — HOSPITAL ENCOUNTER (OUTPATIENT)
Age: 28
Setting detail: SPECIMEN
Discharge: HOME | End: 2023-03-13

## 2023-03-13 ENCOUNTER — OFFICE VISIT (OUTPATIENT)
Dept: OBGYN CLINIC | Age: 28
End: 2023-03-13
Payer: OTHER GOVERNMENT

## 2023-03-13 VITALS
DIASTOLIC BLOOD PRESSURE: 72 MMHG | BODY MASS INDEX: 25.95 KG/M2 | WEIGHT: 141 LBS | HEIGHT: 62 IN | SYSTOLIC BLOOD PRESSURE: 118 MMHG

## 2023-03-13 DIAGNOSIS — R52 POSTPARTUM PAIN: ICD-10-CM

## 2023-03-13 DIAGNOSIS — N94.10 DYSPAREUNIA IN FEMALE: ICD-10-CM

## 2023-03-13 DIAGNOSIS — Z01.419 WELL WOMAN EXAM WITH ROUTINE GYNECOLOGICAL EXAM: Primary | ICD-10-CM

## 2023-03-13 PROBLEM — Z23 NEED FOR TDAP VACCINATION: Status: RESOLVED | Noted: 2022-04-05 | Resolved: 2023-03-13

## 2023-03-13 PROBLEM — Z52.819: Status: RESOLVED | Noted: 2022-01-26 | Resolved: 2023-03-13

## 2023-03-13 PROCEDURE — 99395 PREV VISIT EST AGE 18-39: CPT | Performed by: OBSTETRICS & GYNECOLOGY

## 2023-03-13 ASSESSMENT — ENCOUNTER SYMPTOMS
COUGH: 0
BACK PAIN: 0
ABDOMINAL PAIN: 0
SHORTNESS OF BREATH: 0

## 2023-03-20 LAB — CYTOLOGY REPORT: NORMAL

## 2023-03-31 ENCOUNTER — TELEPHONE (OUTPATIENT)
Dept: OBGYN CLINIC | Age: 28
End: 2023-03-31

## 2023-04-04 ENCOUNTER — HOSPITAL ENCOUNTER (OUTPATIENT)
Dept: PHYSICAL THERAPY | Facility: CLINIC | Age: 28
Setting detail: THERAPIES SERIES
Discharge: HOME | End: 2023-04-04
Payer: OTHER GOVERNMENT

## 2023-04-04 PROCEDURE — 97110 THERAPEUTIC EXERCISES: CPT

## 2023-04-04 PROCEDURE — 97161 PT EVAL LOW COMPLEX 20 MIN: CPT

## 2023-04-04 PROCEDURE — 97530 THERAPEUTIC ACTIVITIES: CPT

## 2023-04-04 NOTE — CONSULTS
At 4 cm above umbilicus:  1 finger width, 1 knuckle depth                           At umbilicus:   1 finger width, 1 knuckle depth                           At 4 cm below umbilicus: 1 finger width, 1 knuckle depth                        Bulge present: [] Present [x] Absent      Abdominal wall assessment:         Scar present [x] Present [] Absent                            If yes, restriction present?  [x] Present [] Absent                                        Quadrant (s)[x] right lower quadrant [x] left lower quadrant       Thoracolumbar mobility screen: rotation 25% limited     Low back: Lumbar mobility screen: full, ext with pinching sensation in R SIJ     Strength:  Hip Strength Left Right   Hip Flex 4 4   Ext 4 4   ER 4 4   IR 4 4   ABD 4 4   ADD 4 4     Core strength: Poor      Hip Joint/Flexibility: (+) B inflexibility testing: Piriformis, 90/90 HS & Isidro     Sacroiliac joint cluster test:   1) Compression (+) increase in pain   2) Distraction (+) increase in pain   3) B P4 (+) increase in pain   4) - B NURYS & - B FADIR   5) + R forward bending      OBSERVATION  No Deficit  Deficit  Not Tested  Comments    External                Posture    x   Sway back    Pelvic alignment      x       Muscle Spasm     x        Scarring    x    c- section scar - mild restrictions throughout    Diastasis   x    See above   Introitus    x    Increased pain & reflexive contraction upon 1 finger entry   Perineal Descent    x   Able to relax 75% after 1 rep strength testing   Skin Condition    x    Mild erythema  Atrophy noted   Internal            Prolapse Test (POP)   x   Grade 1 cystocele   Muscle bulk    x    Noted areas of tension at B IC & B OI    Quality of Contraction    x   Able to relax 75% after 1 rep strength testing   Sensation    x   Increased discomfort & sensitivity with transitioning along PFM     Internal PERFECT scale: P= present; A= Absent   Did not fully test due to pain   Power (MMT) Endurance (up to

## 2023-04-19 ENCOUNTER — HOSPITAL ENCOUNTER (OUTPATIENT)
Dept: PHYSICAL THERAPY | Facility: CLINIC | Age: 28
Setting detail: THERAPIES SERIES
Discharge: HOME | End: 2023-04-19
Payer: OTHER GOVERNMENT

## 2023-04-19 ENCOUNTER — PROCEDURE VISIT (OUTPATIENT)
Dept: OBGYN CLINIC | Age: 28
End: 2023-04-19

## 2023-04-19 ENCOUNTER — HOSPITAL ENCOUNTER (OUTPATIENT)
Age: 28
Setting detail: SPECIMEN
Discharge: HOME | End: 2023-04-19

## 2023-04-19 VITALS
DIASTOLIC BLOOD PRESSURE: 82 MMHG | HEART RATE: 64 BPM | BODY MASS INDEX: 25.97 KG/M2 | SYSTOLIC BLOOD PRESSURE: 127 MMHG | WEIGHT: 142 LBS

## 2023-04-19 DIAGNOSIS — R87.810 CERVICAL HIGH RISK HPV (HUMAN PAPILLOMAVIRUS) TEST POSITIVE: Primary | ICD-10-CM

## 2023-04-19 PROCEDURE — 97140 MANUAL THERAPY 1/> REGIONS: CPT

## 2023-04-19 PROCEDURE — 97110 THERAPEUTIC EXERCISES: CPT

## 2023-04-19 NOTE — FLOWSHEET NOTE
rotation   [x] ? Strength: core, PF, & B hip/gluteal weakness   [x] ? Function: LISA: 41.7% impairment, 10 points; PPIQ: 5 points (\"quite a bit\" for intimacy; \"a little bit\" for mood)  [place]  Depression Scale: 11 points (Possible Depression: 10+, denies suicidal ideation)  [x] Other: UUI, STALIN, AMEE, impaired load transfer,  scar; Core instability & difficultly managing IAP noted with B SLS & B ASLR       STG: (to be met in 8 treatments)  Pt will be able to isolate PF musculature for improved PF awareness & function   Independent with Home Exercise Programs & will adhere to abdominal/scar tissue self mobilization for improved core & PFM function  Pt to demo ability to hold B ASLR & B SLS without significant compensations to reduce load transfer and improve core, SL stability & endurance  Patient will demo use of functional PFM contraction by performing a pelvic brace or knack in order to reduce/eliminate UI during a cough/sneeze/laugh. Pt will demo independence with constipation & urinary urge protocols to improve bowel & bladder function   Pt will demo ability to expand PF during diaphragmatic breathing for improved PF relaxation/ROM  Pt to have had purchased pelvic wand to adhere to tissue training protocol to improve tissue tolerance  Pt will demo (-) B hip flexibility tests (Piriformis, 90/90 HS & Dorise Joseph) to ease ADLs   Pt will report ability to have sexual partner achieve initial penetration with no reproduction of sxs. Pt will demo improved B thoracic rotation to WNLs to improve breath/rib cage expansion & ease with ADLs.       LTG: (to be met in 16 treatments)  Pt will report 0-1/10 average pain for improved QOL  Pt to achieve PF & core strength to 4/5 &/or endurance for >8 seconds for optimal PF function if applicable & PFM tension has resolved  Pt to be able to perform all advanced ADL's without leaking for improved urogenital function  Pt will reports ease with defecation by 80% &

## 2023-04-19 NOTE — PROGRESS NOTES
Yes   Unsatisfactory: No    Physical Exam  Genitourinary:                   ECC performed:  Yes    Lugols Iodine Applied:   No       IMPRESSIONS: NORA 1-2  Biopsy sites: 12 o'clock      Assessment:   Diagnosis Orders   1. Cervical high risk HPV (human papillomavirus) test positive  Colposcopy    Surgical Pathology            PLAN:   1. The patient was given formal restriction guidelines. She is instructed to report         any increase in vaginal bleeding, discharge, or any temperature more than      100.4   F. Strict pelvic rest precautions were reviewed with lifting restrictions. Will call pt with results of colposcopy.          Quincy Robb MD

## 2023-04-24 LAB — SURGICAL PATHOLOGY REPORT: NORMAL

## 2023-04-27 ENCOUNTER — HOSPITAL ENCOUNTER (OUTPATIENT)
Dept: PHYSICAL THERAPY | Facility: CLINIC | Age: 28
Setting detail: THERAPIES SERIES
Discharge: HOME | End: 2023-04-27
Payer: OTHER GOVERNMENT

## 2023-04-27 PROCEDURE — 97110 THERAPEUTIC EXERCISES: CPT

## 2023-04-27 NOTE — FLOWSHEET NOTE
[] [facility]) - [facility] &  Therapy  [address]  [phone]  F: [phone] [x] [address]  [facility]   [address]  P: [phone]  F: [phone] [] [address]  [address]  P: [phone]  F: [phone] [] [address]  P: [phone]  F: [phone] [] [address]  [facility]   [address]   [place]: [phone]  F: [phone]      Physical Therapy Daily Treatment Note    Date:  2023  Patient Name:  Forrest Hoskins    :  1995  MRN: [id number]  Physician: Dorothea 7833: [address] -covered codes are: 98761, [id number], [id number], [id number], 0431 19 97 94 (no prior auth required)   NOTE:  26755,56006 are NOT covered - 03310, [id number]  Medical Diagnosis:   O90.89, R52 (ICD-10-CM) - Postpartum pain   N94.10 (ICD-10-CM) - Dyspareunia in female   Rehab Codes: R27.8, R29.3, M62.81, N39.46, N94.1, M99.05, M62.08  Onset Date: 3/13/22 - date of referral   Next 's appt. : 23  Visit# / total visits:      Cancels/No Shows: 0    Subjective:    Pain:  [x] Yes  [] No  Location: LBP   Pain Rating: (0-10 scale) 0/10  Pain altered Tx:  [x] No  [] Yes  Action:    Comments: Pt had a colposcopy last week with an abnormal biopsy. Will have a LEEP procedure 23. Pt was told she has no restrictions following the biopsy, however has been more consistent with her wand usage and denies c/o discomfort recently. Opted to hold internal treatment this date.      Objective:  Modalities:   Precautions: biofeedback not covered  Exercises: Shahzad Garrison Access Code:  [email or web address]  KT= Kinesiotape  PB= pelvic bracing (DB+ kegel)  DB= diaphragmatic breathing  Exercise Reps/ Time Weight/ Level Comments

## 2023-05-04 ENCOUNTER — HOSPITAL ENCOUNTER (OUTPATIENT)
Dept: PHYSICAL THERAPY | Facility: CLINIC | Age: 28
Setting detail: THERAPIES SERIES
Discharge: HOME | End: 2023-05-04

## 2023-05-04 NOTE — FLOWSHEET NOTE
[] [facility]) [facility] &  Therapy  [address]    [phone]  F: [phone]   [x] [address]  [facility]   [address]  P: [phone]  F: [phone]  [] [place]  [address]  P: [phone]  F: [phone] [] [address]  P: [phone]  F: [phone]  [] [address]  [facility]   [address]   [place]: [phone]  F: [phone]   [] [address]  [place]: [phone]   F: [phone]     Physical Therapy Cancel/No Show note    Date: 2023  Patient: Zafar Cameron  : 1995  MRN: [id number]    Cancels/No Shows to date:     For today's appointment patient:    [x]  Cancelled    [] Rescheduled appointment    [] No-show     Reason given by patient:    []  Patient ill    []  Conflicting appointment    [] No transportation      [] Conflict with work    [x] No reason given    [] Weather related    [] COVID-19    [] Other:      Comments:        [x] Next appointment was confirmed    Electronically signed by: Linnea Nava PT

## 2023-05-12 ENCOUNTER — HOSPITAL ENCOUNTER (OUTPATIENT)
Dept: PHYSICAL THERAPY | Facility: CLINIC | Age: 28
Setting detail: THERAPIES SERIES
Discharge: HOME | End: 2023-05-12
Payer: OTHER GOVERNMENT

## 2023-05-12 PROCEDURE — 97110 THERAPEUTIC EXERCISES: CPT

## 2023-05-12 PROCEDURE — 97140 MANUAL THERAPY 1/> REGIONS: CPT

## 2023-05-12 NOTE — FLOWSHEET NOTE
seconds hold  Patient Education  - Office Posture  - Sleep Positions  - Household Activities  - Treating Persistent Pain Without Medications: Relaxation  - Heat  - Ice  - Bowel Emptying Techniques  - Abdominal Massage for Constipation  - Get To Know Your Pelvic Floor- Female  - Sway Back Posture  - Healthy Posture: How to Hold and Lift a Baby  Patient Education  - Office Posture  - Sleep Positions  - Household Activities  - Treating Persistent Pain Without Medications: Relaxation  - Heat  - Ice  - Bowel Emptying Techniques  - Abdominal Massage for Constipation  - Get To Know Your Pelvic Floor- Female  - Sway Back Posture  - Healthy Posture: How to Hold and Lift a Baby  Comprehension of Education:  [x] Verbalizes understanding. [x] Demonstrates understanding. [x] Needs review. For tolerance and pain complaints   [x] Demonstrates/verbalizes HEP/Ed previously given. Plan: [x] Continue current frequency toward long and short term goals. [x] Specific Instructions for subsequent treatments: monitor pain, review wand usage, proceed to strengthening as appropriate.         Time In: 8:00am              Time Out: 8:53am    Electronically signed by:  Shauna Rudd PTA

## 2023-05-17 NOTE — H&P
OB/GYN Pre-Op H&P  [facility]    Patient Name: Evelyn Dickson     Patient : 1995  Room/Bed: ST OR [facility]/NONE  Admission Date/Time: 2023  7:16 AM  Primary Care Physician: Teodoro Malik M.D., MD  MRN: [id number]    Date: 2023  Time: 8:39 AM    The patient was seen in pre-op holding. She is here for previously scheduled LEEP. The patient had a pap smear that demonstrated ASCUS, positive HRHPV on 8/10/21 and a pap on 3/13/2023 which revealed NILM, other HRHPV. She underwent colposcopy and pathology revealed NORA 2-3 on 2023. Decision was made to proceed with excisional procedure at that time. The procedure risks and complications were reviewed. The labs, Consent, and H&P were reviewed and updated. The patient was counseled on the possibility of  the need of a second surgery. The patient voiced understanding and had all of her questions answered. The possibility of incomplete removal of abnormal tissue was discussed. OBSTETRICAL HISTORY:   OB History    Para Term  AB Living   1 1 1 0 0 1   SAB IAB Ectopic Molar Multiple Live Births   0 0 0 0 0 1      # Outcome Date GA Lbr Victor Manuel/2nd Weight Sex Delivery Anes PTL Lv   1 Term 22 39w6d  8 lb 8.9 oz (3.88 kg) M CS-LTranv Spinal N DOMINGUEZ      Complications: Arrest of descent, delivered, current hospitalization      Name: Abiola Sat: 7  Apgar5: 8       PAST MEDICAL HISTORY:   has a past medical history of Abnormal Pap smear of cervix, Anxiety, Attention deficit disorder (ADD) without hyperactivity, COVID-19, COVID-19 vaccine series completed, Depression, Encounter for assisted reproductive fertility cycle, and Neurologic disorder. PAST SURGICAL HISTORY:   has a past surgical history that includes Hymenectomy ();  section (N/A, 2022); and Colonoscopy.     ALLERGIES:  Allergies as of 2023 - Fully Reviewed 2023   Allergen Reaction Noted    Sulfa antibiotics Itching and Other

## 2023-05-18 ENCOUNTER — APPOINTMENT (OUTPATIENT)
Dept: PHYSICAL THERAPY | Facility: CLINIC | Age: 28
End: 2023-05-18
Payer: OTHER GOVERNMENT

## 2023-05-18 ENCOUNTER — ANESTHESIA (OUTPATIENT)
Dept: OPERATING ROOM | Age: 28
End: 2023-05-18
Payer: OTHER GOVERNMENT

## 2023-05-18 ENCOUNTER — HOSPITAL ENCOUNTER (OUTPATIENT)
Age: 28
Setting detail: OUTPATIENT SURGERY
Discharge: HOME | End: 2023-05-18
Attending: OBSTETRICS & GYNECOLOGY | Admitting: OBSTETRICS & GYNECOLOGY
Payer: OTHER GOVERNMENT

## 2023-05-18 ENCOUNTER — ANESTHESIA EVENT (OUTPATIENT)
Dept: OPERATING ROOM | Age: 28
End: 2023-05-18
Payer: OTHER GOVERNMENT

## 2023-05-18 VITALS
TEMPERATURE: 97.2 F | OXYGEN SATURATION: 98 % | BODY MASS INDEX: 25.4 KG/M2 | HEIGHT: 62 IN | DIASTOLIC BLOOD PRESSURE: 75 MMHG | SYSTOLIC BLOOD PRESSURE: 107 MMHG | HEART RATE: 51 BPM | RESPIRATION RATE: 11 BRPM | WEIGHT: 138 LBS

## 2023-05-18 DIAGNOSIS — N87.1 DYSPLASIA OF CERVIX, HIGH GRADE CIN 2: ICD-10-CM

## 2023-05-18 DIAGNOSIS — D06.9 CIN III (CERVICAL INTRAEPITHELIAL NEOPLASIA GRADE III) WITH SEVERE DYSPLASIA: ICD-10-CM

## 2023-05-18 PROBLEM — Z98.890 POSTOPERATIVE STATE: Status: ACTIVE | Noted: 2023-05-18

## 2023-05-18 LAB
ABO + RH BLD: NORMAL
ARM BAND NUMBER: NORMAL
BLOOD GROUP ANTIBODIES SERPL: NEGATIVE
ERYTHROCYTE [DISTWIDTH] IN BLOOD BY AUTOMATED COUNT: 12.1 % (ref 11.8–14.4)
EXPIRATION DATE: NORMAL
HCG, PREGNANCY URINE (POC): NEGATIVE
HCT VFR BLD AUTO: 46.2 % (ref 36.3–47.1)
HGB BLD-MCNC: 15.1 G/DL (ref 11.9–15.1)
MCH RBC QN AUTO: 28.5 PG (ref 25.2–33.5)
MCHC RBC AUTO-ENTMCNC: 32.7 G/DL (ref 28.4–34.8)
MCV RBC AUTO: 87.2 FL (ref 82.6–102.9)
NRBC AUTOMATED: 0 PER 100 WBC
PLATELET # BLD AUTO: 193 K/UL (ref 138–453)
PMV BLD AUTO: 9.4 FL (ref 8.1–13.5)
RBC # BLD AUTO: 5.3 M/UL (ref 3.95–5.11)
WBC OTHER # BLD: 5.7 K/UL (ref 3.5–11.3)

## 2023-05-18 PROCEDURE — 3600000003 HC SURGERY LEVEL 3 BASE: Performed by: OBSTETRICS & GYNECOLOGY

## 2023-05-18 PROCEDURE — 2500000003 HC RX 250 WO HCPCS

## 2023-05-18 PROCEDURE — 7100000000 HC PACU RECOVERY - FIRST 15 MIN: Performed by: OBSTETRICS & GYNECOLOGY

## 2023-05-18 PROCEDURE — 86901 BLOOD TYPING SEROLOGIC RH(D): CPT

## 2023-05-18 PROCEDURE — 6370000000 HC RX 637 (ALT 250 FOR IP): Performed by: OBSTETRICS & GYNECOLOGY

## 2023-05-18 PROCEDURE — 6360000002 HC RX W HCPCS

## 2023-05-18 PROCEDURE — 85027 COMPLETE CBC AUTOMATED: CPT

## 2023-05-18 PROCEDURE — 3700000001 HC ADD 15 MINUTES (ANESTHESIA): Performed by: OBSTETRICS & GYNECOLOGY

## 2023-05-18 PROCEDURE — 86900 BLOOD TYPING SEROLOGIC ABO: CPT

## 2023-05-18 PROCEDURE — 3700000000 HC ANESTHESIA ATTENDED CARE: Performed by: OBSTETRICS & GYNECOLOGY

## 2023-05-18 PROCEDURE — 7100000010 HC PHASE II RECOVERY - FIRST 15 MIN: Performed by: OBSTETRICS & GYNECOLOGY

## 2023-05-18 PROCEDURE — 88305 TISSUE EXAM BY PATHOLOGIST: CPT

## 2023-05-18 PROCEDURE — 2580000003 HC RX 258: Performed by: ANESTHESIOLOGY

## 2023-05-18 PROCEDURE — 7100000011 HC PHASE II RECOVERY - ADDTL 15 MIN: Performed by: OBSTETRICS & GYNECOLOGY

## 2023-05-18 PROCEDURE — 7100000001 HC PACU RECOVERY - ADDTL 15 MIN: Performed by: OBSTETRICS & GYNECOLOGY

## 2023-05-18 PROCEDURE — 88342 IMHCHEM/IMCYTCHM 1ST ANTB: CPT

## 2023-05-18 PROCEDURE — 2500000003 HC RX 250 WO HCPCS: Performed by: OBSTETRICS & GYNECOLOGY

## 2023-05-18 PROCEDURE — 3600000013 HC SURGERY LEVEL 3 ADDTL 15MIN: Performed by: OBSTETRICS & GYNECOLOGY

## 2023-05-18 PROCEDURE — 2709999900 HC NON-CHARGEABLE SUPPLY: Performed by: OBSTETRICS & GYNECOLOGY

## 2023-05-18 PROCEDURE — 57522 CONIZATION OF CERVIX: CPT | Performed by: OBSTETRICS & GYNECOLOGY

## 2023-05-18 PROCEDURE — 86850 RBC ANTIBODY SCREEN: CPT

## 2023-05-18 PROCEDURE — 81025 URINE PREGNANCY TEST: CPT

## 2023-05-18 RX ORDER — DROPERIDOL 2.5 MG/ML
0.62 INJECTION, SOLUTION INTRAMUSCULAR; INTRAVENOUS
Status: CANCELLED | OUTPATIENT
Start: 2023-05-18 | End: 2023-05-19

## 2023-05-18 RX ORDER — IODINE SOLUTION STRONG 5% (LUGOL'S) 5 %
SOLUTION ORAL PRN
Status: DISCONTINUED | OUTPATIENT
Start: 2023-05-18 | End: 2023-05-18 | Stop reason: HOSPADM

## 2023-05-18 RX ORDER — AMOXICILLIN 250 MG
1 CAPSULE ORAL 2 TIMES DAILY PRN
Qty: 60 TABLET | Refills: 1 | Status: SHIPPED | OUTPATIENT
Start: 2023-05-18

## 2023-05-18 RX ORDER — KETOROLAC TROMETHAMINE 30 MG/ML
INJECTION, SOLUTION INTRAMUSCULAR; INTRAVENOUS PRN
Status: DISCONTINUED | OUTPATIENT
Start: 2023-05-18 | End: 2023-05-18 | Stop reason: SDUPTHER

## 2023-05-18 RX ORDER — IBUPROFEN 600 MG/1
600 TABLET ORAL EVERY 6 HOURS PRN
Qty: 30 TABLET | Refills: 1 | Status: SHIPPED | OUTPATIENT
Start: 2023-05-18 | End: 2023-05-18 | Stop reason: SDUPTHER

## 2023-05-18 RX ORDER — MEPERIDINE HYDROCHLORIDE 50 MG/ML
12.5 INJECTION INTRAMUSCULAR; INTRAVENOUS; SUBCUTANEOUS EVERY 5 MIN PRN
Status: CANCELLED | OUTPATIENT
Start: 2023-05-18

## 2023-05-18 RX ORDER — SODIUM CHLORIDE 9 MG/ML
25 INJECTION, SOLUTION INTRAVENOUS PRN
Status: CANCELLED | OUTPATIENT
Start: 2023-05-18

## 2023-05-18 RX ORDER — LIDOCAINE HYDROCHLORIDE 10 MG/ML
INJECTION, SOLUTION EPIDURAL; INFILTRATION; INTRACAUDAL; PERINEURAL PRN
Status: DISCONTINUED | OUTPATIENT
Start: 2023-05-18 | End: 2023-05-18 | Stop reason: SDUPTHER

## 2023-05-18 RX ORDER — AMOXICILLIN 250 MG
1 CAPSULE ORAL 2 TIMES DAILY PRN
Qty: 60 TABLET | Refills: 1 | Status: SHIPPED | OUTPATIENT
Start: 2023-05-18 | End: 2023-05-18 | Stop reason: SDUPTHER

## 2023-05-18 RX ORDER — ACETAMINOPHEN 500 MG
1000 TABLET ORAL EVERY 6 HOURS PRN
Qty: 60 TABLET | Refills: 1 | Status: SHIPPED | OUTPATIENT
Start: 2023-05-18

## 2023-05-18 RX ORDER — ONDANSETRON 2 MG/ML
INJECTION INTRAMUSCULAR; INTRAVENOUS PRN
Status: DISCONTINUED | OUTPATIENT
Start: 2023-05-18 | End: 2023-05-18 | Stop reason: SDUPTHER

## 2023-05-18 RX ORDER — IBUPROFEN 600 MG/1
600 TABLET ORAL EVERY 6 HOURS PRN
Qty: 30 TABLET | Refills: 1 | Status: SHIPPED | OUTPATIENT
Start: 2023-05-18

## 2023-05-18 RX ORDER — SODIUM CHLORIDE 0.9 % (FLUSH) 0.9 %
5-40 SYRINGE (ML) INJECTION EVERY 12 HOURS SCHEDULED
Status: CANCELLED | OUTPATIENT
Start: 2023-05-18

## 2023-05-18 RX ORDER — SODIUM CHLORIDE, SODIUM LACTATE, POTASSIUM CHLORIDE, CALCIUM CHLORIDE 600; 310; 30; 20 MG/100ML; MG/100ML; MG/100ML; MG/100ML
INJECTION, SOLUTION INTRAVENOUS CONTINUOUS
Status: DISCONTINUED | OUTPATIENT
Start: 2023-05-18 | End: 2023-05-18 | Stop reason: HOSPADM

## 2023-05-18 RX ORDER — DIPHENHYDRAMINE HYDROCHLORIDE 50 MG/ML
INJECTION INTRAMUSCULAR; INTRAVENOUS PRN
Status: DISCONTINUED | OUTPATIENT
Start: 2023-05-18 | End: 2023-05-18 | Stop reason: SDUPTHER

## 2023-05-18 RX ORDER — ACETAMINOPHEN 500 MG
1000 TABLET ORAL EVERY 6 HOURS PRN
Qty: 60 TABLET | Refills: 1 | Status: SHIPPED | OUTPATIENT
Start: 2023-05-18 | End: 2023-05-18 | Stop reason: SDUPTHER

## 2023-05-18 RX ORDER — MIDAZOLAM HYDROCHLORIDE 1 MG/ML
INJECTION INTRAMUSCULAR; INTRAVENOUS PRN
Status: DISCONTINUED | OUTPATIENT
Start: 2023-05-18 | End: 2023-05-18 | Stop reason: SDUPTHER

## 2023-05-18 RX ORDER — METOCLOPRAMIDE HYDROCHLORIDE 5 MG/ML
10 INJECTION INTRAMUSCULAR; INTRAVENOUS
Status: CANCELLED | OUTPATIENT
Start: 2023-05-18 | End: 2023-05-19

## 2023-05-18 RX ORDER — DIPHENHYDRAMINE HYDROCHLORIDE 50 MG/ML
12.5 INJECTION INTRAMUSCULAR; INTRAVENOUS
Status: CANCELLED | OUTPATIENT
Start: 2023-05-18 | End: 2023-05-19

## 2023-05-18 RX ORDER — FENTANYL CITRATE 50 UG/ML
INJECTION, SOLUTION INTRAMUSCULAR; INTRAVENOUS PRN
Status: DISCONTINUED | OUTPATIENT
Start: 2023-05-18 | End: 2023-05-18 | Stop reason: SDUPTHER

## 2023-05-18 RX ORDER — DEXAMETHASONE SODIUM PHOSPHATE 10 MG/ML
INJECTION INTRAMUSCULAR; INTRAVENOUS PRN
Status: DISCONTINUED | OUTPATIENT
Start: 2023-05-18 | End: 2023-05-18 | Stop reason: SDUPTHER

## 2023-05-18 RX ORDER — SCOLOPAMINE TRANSDERMAL SYSTEM 1 MG/1
1 PATCH, EXTENDED RELEASE TRANSDERMAL ONCE
Status: DISCONTINUED | OUTPATIENT
Start: 2023-05-18 | End: 2023-05-18 | Stop reason: HOSPADM

## 2023-05-18 RX ORDER — SODIUM CHLORIDE 0.9 % (FLUSH) 0.9 %
5-40 SYRINGE (ML) INJECTION PRN
Status: CANCELLED | OUTPATIENT
Start: 2023-05-18

## 2023-05-18 RX ORDER — HYDRALAZINE HYDROCHLORIDE 20 MG/ML
10 INJECTION INTRAMUSCULAR; INTRAVENOUS
Status: CANCELLED | OUTPATIENT
Start: 2023-05-18

## 2023-05-18 RX ORDER — PROPOFOL 10 MG/ML
INJECTION, EMULSION INTRAVENOUS PRN
Status: DISCONTINUED | OUTPATIENT
Start: 2023-05-18 | End: 2023-05-18 | Stop reason: SDUPTHER

## 2023-05-18 RX ORDER — LIDOCAINE HYDROCHLORIDE AND EPINEPHRINE 10; 10 MG/ML; UG/ML
INJECTION, SOLUTION INFILTRATION; PERINEURAL PRN
Status: DISCONTINUED | OUTPATIENT
Start: 2023-05-18 | End: 2023-05-18 | Stop reason: HOSPADM

## 2023-05-18 RX ADMIN — ONDANSETRON 4 MG: 2 INJECTION INTRAMUSCULAR; INTRAVENOUS at 09:45

## 2023-05-18 RX ADMIN — FENTANYL CITRATE 50 MCG: 50 INJECTION, SOLUTION INTRAMUSCULAR; INTRAVENOUS at 09:45

## 2023-05-18 RX ADMIN — PROPOFOL 200 MG: 10 INJECTION, EMULSION INTRAVENOUS at 09:38

## 2023-05-18 RX ADMIN — DEXAMETHASONE SODIUM PHOSPHATE 10 MG: 10 INJECTION INTRAMUSCULAR; INTRAVENOUS at 09:45

## 2023-05-18 RX ADMIN — MIDAZOLAM 2 MG: 1 INJECTION INTRAMUSCULAR; INTRAVENOUS at 09:35

## 2023-05-18 RX ADMIN — DIPHENHYDRAMINE HYDROCHLORIDE 12.5 MG: 50 INJECTION, SOLUTION INTRAMUSCULAR; INTRAVENOUS at 09:45

## 2023-05-18 RX ADMIN — LIDOCAINE HYDROCHLORIDE 50 MG: 10 INJECTION, SOLUTION EPIDURAL; INFILTRATION; INTRACAUDAL; PERINEURAL at 09:38

## 2023-05-18 RX ADMIN — KETOROLAC TROMETHAMINE 30 MG: 30 INJECTION, SOLUTION INTRAMUSCULAR; INTRAVENOUS at 09:53

## 2023-05-18 RX ADMIN — SODIUM CHLORIDE, POTASSIUM CHLORIDE, SODIUM LACTATE AND CALCIUM CHLORIDE: 600; 310; 30; 20 INJECTION, SOLUTION INTRAVENOUS at 08:50

## 2023-05-18 ASSESSMENT — PAIN - FUNCTIONAL ASSESSMENT: PAIN_FUNCTIONAL_ASSESSMENT: NONE - DENIES PAIN

## 2023-05-18 NOTE — DISCHARGE INSTRUCTIONS
No alcoholic beverages, no driving or operating machinery, no making important decisions for 24 hours. Children should maintain quiet play ( games, movies, books ) for 24 hours. You may have a normal diet but should eat lightly day of surgery. Drink plenty of fluids.   Urinate within 8 hours after surgery, if unable to urinate call your doctor     Call your doctor for the following:   Chills   Temperature greater than 101   Pain that is not tolerable despite taking pain medicine as ordered   There is increased swelling, redness or warmth at surgical site   There is increased drainage or bleeding from surgical site   Do not remove surgical dressing unless instructed to do so by your surgeon

## 2023-05-18 NOTE — ANESTHESIA PRE PROCEDURE
Department of Anesthesiology  Preprocedure Note       Name:  Davon Tilley   Age:  32 y.o.  :  1995                                          MRN:  [id number]         Date:  2023      Surgeon: Cristin Nagel):  Cade Damico MD    Procedure: Procedure(s):  LEEP    Medications prior to admission:   Prior to Admission medications    Medication Sig Start Date End Date Taking? Authorizing Provider   sertraline (ZOLOFT) 100 MG tablet Take 2 tablets by mouth in the morning. 8/10/22   Santos Mccarty DO   ibuprofen (ADVIL;MOTRIN) 600 MG tablet Take 1 tablet by mouth every 6 hours as needed for Pain  Patient not taking: Reported on 3/13/2023 6/28/22   Poncho Carlisle MD   Docosahexaenoic Acid (PRENATAL DHA PO) Take by mouth    Historical Provider, MD       Current medications:    Current Facility-Administered Medications   Medication Dose Route Frequency Provider Last Rate Last Admin    scopolamine (TRANSDERM-SCOP) transdermal patch 1 patch  1 patch TransDERmal Once Cade Damico MD           Allergies: Allergies   Allergen Reactions    Chlorhexidine Hives and Rash    Sulfa Antibiotics Itching and Other (See Comments)     Burning internally    Sulfamethoxazole-Trimethoprim        Problem List:    Patient Active Problem List   Diagnosis Code    Thoracic back sprain S23. 9XXA    ASCUS with positive high risk HPV cervical R87.610, R87.810    Depression F32. A    High risk for colon cancer Z91.89    Increased risk of breast cancer Z91.89    COVID-19 vaccine series completed Z92.29    PLTCS 22 M Apg 7/8 Wt 8#8 Y47.162       Past Medical History:        Diagnosis Date    Abnormal Pap smear of cervix     Anxiety     Attention deficit disorder (ADD) without hyperactivity     child but never treated    COVID-19 2020    mild symptoms x 1 week    COVID-19 vaccine series completed     Depression     Encounter for assisted reproductive fertility cycle     IUI, partner female    Neurologic

## 2023-05-18 NOTE — ANESTHESIA POSTPROCEDURE EVALUATION
POST- ANESTHESIA EVALUATION       Pt Name: Luke Frye  MRN: [id number]  YOB: 1995  Date of evaluation: 5/18/2023  Time:  11:18 AM      /75   Pulse 51   Temp 97.2 °F (36.2 °C)   Resp 11   Ht 5' 2\" (1.575 m)   Wt 138 lb (62.6 kg)   SpO2 98%   Breastfeeding Yes   BMI 25.24 kg/m²      Consciousness Level  Awake  Cardiopulmonary Status  Stable  Pain Adequately Treated YES  Nausea / Vomiting  NO  Adequate Hydration  YES  Anesthesia Related Complications NONE      Electronically signed by Feroz Iglesias MD on 5/18/2023 at 11:18 AM       Department of Anesthesiology  Postprocedure Note    Patient: Luke Frye  MRN: [id number]  YOB: 1995  Date of evaluation: 5/18/2023      Procedure Summary     Date: 05/18/23 Room / Location: [facility] 16 / [facility]    Anesthesia Start: 0932 Anesthesia Stop: 1010    Procedure: LEEP (Cervix) Diagnosis:       NORA III (cervical intraepithelial neoplasia grade III) with severe dysplasia      Dysplasia of cervix, high grade NORA 2      (NORA II, NORA III)    Surgeons: Scott Johnson MD Responsible Provider: Feroz Iglesias MD    Anesthesia Type: general ASA Status: 2          Anesthesia Type: No value filed.     Sharonda Phase I: Sharonda Score: 10    Sharonda Phase II: Sharonda Score: 10      Anesthesia Post Evaluation

## 2023-05-18 NOTE — OP NOTE
Operative Note  Department of Obstetrics and Gynecology  [address] [facility]       Patient: Papito Mcmullen   : 1995  MRN: [id number]       Acct: [de-identified]   PCP: Vadim Jhaveri M.D., MD  Date of Procedure: 23    Pre-operative Diagnosis: 32 y.o. female   High Risk HPV Carrier  NORA 2-3 on Cervical Biopsy  BMI 25    Post-operative Diagnosis: same as above, some areas of decreased uptake on posterior cervix with lugols    Procedure: Loop Electrosurgery Excisional Procedure    Surgeon: Dr. Mahin Pruitt  Assistants: Sumaya Manuel DO; PGY4    Anesthesia: general    Indications: The patient is a 32year old female who had  a pap smear that demonstrated ASCUS, positive HRHPV on 8/10/21 and a pap on 3/13/2023 which revealed NILM, other HRHPV. She underwent colposcopy and pathology revealed NORA 2-3 on 2023 at biopsy at 12 o'clock. ECC was negative. Decision was made to proceed with excisional procedure. Procedure Details: The patient was seen in the pre-op room. The risks, benefits, complications, treatment options, and expected outcomes were discussed with the patient. The patient concurred with the proposed plan, giving informed consent. The patient was taken to the Operating Room and identified as Papito Mcmullen and the procedure was verified. A Time Out was held and the above information confirmed. After administration of general anesthesia, the patient was placed in the dorsolithotomy position utilizing Yellofin stirrups. The patient was prepped and draped in the usual sterile fashion. The insulated speculum was placed into the vagina to visualize the cervix. Lugol's Iodine was applied to the cervix revealing  an area of decreased uptake at 6 o'clock. Using green 15 mm loop an approximately 4mm deep specimen of posterior cervix was obtained swiping from right to left.  The same loop was used to create an approximately 5mm deep specimen of the anterior cervix, swiping

## 2023-05-23 ENCOUNTER — HOSPITAL ENCOUNTER (OUTPATIENT)
Dept: PHYSICAL THERAPY | Facility: CLINIC | Age: 28
Setting detail: THERAPIES SERIES
Discharge: HOME | End: 2023-05-23
Payer: OTHER GOVERNMENT

## 2023-05-23 LAB — SURGICAL PATHOLOGY REPORT: NORMAL

## 2023-05-23 PROCEDURE — 97110 THERAPEUTIC EXERCISES: CPT

## 2023-05-23 NOTE — FLOWSHEET NOTE
[] [facility]) [facility] &  Therapy  [address]  [phone]  F: [phone] [x] [address]  [address]   [address]  P: [phone]  F: [phone] [] [address]  [address]  P: [phone]  F: [phone] [] [address]  P: [phone]  F: [phone] [] [address]  [facility]   [address]   [place]: [phone]  F: [phone]      Physical Therapy Daily Treatment Note    Date:  2023  Patient Name:  Jenae Pereira    :  1995  MRN: [id number]  Physician: Dorothea 7833: [address] -covered codes are: 42644, [id number], [id number], [id number], 0431 19 97 94 (no prior auth required)   NOTE:  53788,80375 are NOT covered - 79393, [id number]  Medical Diagnosis:   O90.89, R52 (ICD-10-CM) - Postpartum pain   N94.10 (ICD-10-CM) - Dyspareunia in female   Rehab Codes: R27.8, R29.3, M62.81, N39.46, N94.1, M99.05, M62.08  Onset Date: 3/13/22 - date of referral   Next 's appt. : 23  Visit# / total visits:      Cancels/No Shows: 0    Subjective:    Pain:  [x] Yes  [] No  Location: LBP   Pain Rating: (0-10 scale) 0/10  Pain altered Tx:  [x] No  [] Yes  Action:    Comments: Pt had LEEP procedure last Thursday. Was instructed for pelvic rest until her follow up on 23. Pt states that since her surgery she has been having random leakage of urine.     Objective:  Modalities:   Precautions: biofeedback not covered  Exercises: Elias Flash Access Code:  [email or web address]  KT= Kinesiotape  PB= pelvic bracing (DB+ kegel)  DB= diaphragmatic breathing  Exercise Reps/ Time Weight/ Level Comments   Pelvic model explanation     PF function  3 layers  Analogies - IAP (core cannister,

## 2023-06-01 ENCOUNTER — HOSPITAL ENCOUNTER (OUTPATIENT)
Dept: PHYSICAL THERAPY | Facility: CLINIC | Age: 28
Setting detail: THERAPIES SERIES
Discharge: HOME | End: 2023-06-01
Payer: OTHER GOVERNMENT

## 2023-06-01 PROCEDURE — 97112 NEUROMUSCULAR REEDUCATION: CPT

## 2023-06-01 PROCEDURE — 97110 THERAPEUTIC EXERCISES: CPT

## 2023-06-01 NOTE — FLOWSHEET NOTE
[] [address]  - [facility] &  Therapy  [address]  [phone]  F: [phone] [x] [address]  Located within [facility]   [address]  P: [phone]  F: [phone] [] [address]  [address]  P: [phone]  F: [phone] [] [address]  P: [phone]  F: [phone] [] [address]  [facility]   [address]   [place]: [phone]  F: [phone]      Physical Therapy Daily Treatment Note    Date:  2023  Patient Name:  Aaron Cox    :  1995  MRN: [id number]  Physician: Dorothea Alex33: [address] -covered codes are: 05986, [id number], [id number], [id number], 0431 19 97 94 (no prior auth required)   NOTE:  96957,85579 are NOT covered - 22963, [id number]  Medical Diagnosis:   O90.89, R52 (ICD-10-CM) - Postpartum pain   N94.10 (ICD-10-CM) - Dyspareunia in female   Rehab Codes: R27.8, R29.3, M62.81, N39.46, N94.1, M99.05, M62.08  Onset Date: 3/13/22 - date of referral   Next 's appt. : 23  Visit# / total visits:      Cancels/No Shows: 0    Subjective:    Pain:  [x] Yes  [] No  Location: LBP   Pain Rating: (0-10 scale) 0/10  Pain altered Tx:  [x] No  [] Yes  Action:    Comments: Pt states that she is still having some continuous bleeding since her LEEP procedure, also some mild cramping that pt states is \"not bad\". Pt states that she initially thought she was leaking urine since procedure, but not thinks it's likely just drainage. Objective:  Consent: Patient verbally consented to externally placed surface electrodes for neuro re-ed for pelvic floor muscle relaxation with no red flags present 2023.  Patient was appropriately draped and only areas that were being treated were

## 2023-06-08 ENCOUNTER — HOSPITAL ENCOUNTER (OUTPATIENT)
Dept: PHYSICAL THERAPY | Facility: CLINIC | Age: 28
Setting detail: THERAPIES SERIES
Discharge: HOME | End: 2023-06-08
Payer: OTHER GOVERNMENT

## 2023-06-08 PROCEDURE — 97530 THERAPEUTIC ACTIVITIES: CPT

## 2023-06-08 PROCEDURE — 97110 THERAPEUTIC EXERCISES: CPT

## 2023-06-08 NOTE — FLOWSHEET NOTE
knuckle depth for improved core stability   Pt will report 2-3 point improvement on LISA & \"a little bit to not at all\" on PPIQ (for intimacy & mood) to indicate improved pelvic health functioning  Pt will report ability to have sexual partner achieve deeper penetration with movement and with no reproduction of sxs. Patient goals: Romaine Perez get started on strengthening my abs & PFM\" Progressing 6/8/23    Pt. Education:  [x] Yes  [] No  [x] Reviewed Prior HEP/Ed  Method of Education: [x] Verbal  [x] Demo [x] Written - see below for updates & see assessment for education provided    Access Code: [email or web address]  URL: [email or web address]  Date: 06/08/2023  Prepared by: Kamilah Austin    Program Notes  estrogen cream -  prn squatty potty/ stooltowel lumbar roll for sittingkeep ribs over pelvisMagnesium Citrate - gummies or powder - clear with MD High fiber dietFOCUS ON RELAXATION- filling up the entire abdomen + PF cavity allowing PF to natural bulge  vibrating pelvic wandWww. [employer]/[email or web address] for $5 offPelvic: Hook stretch and pull down sustained firm pressure 1min. Abdomen:Scar tissue massage on 3-5 min all waysskin rolling 4 corners of abdomen 5-10 stroke (thumb in belly & roll skin towards belly). don't press thumb hard, more for reference point.  log rolling - no crunch type activity - to reduce pressure liftin or heavy activity - exhale with effort and pelvic brace (exhale, core contract & PFM lifts)Serola Belt - pelvic stabilization scooping infant from head & buttKimmie Sisto Gens - Sex therapist / counselor [email or web address]    Exercises  -  1 Minute Guided Meditation  - 1 x daily - 7 x weekly - 1 sets - 1 reps - 1 hold  - Supine Diaphragmatic Breathing with Pelvic Floor Lengthening  - 1 x daily - 7 x weekly - 1 sets - 10 reps  - Supine Transversus Abdominis Bracing with Pelvic Floor Contraction  - 1 x daily - 7 x weekly - 1 sets - 10 reps - 3 hold  - Supine Pelvic

## 2023-06-20 ENCOUNTER — HOSPITAL ENCOUNTER (OUTPATIENT)
Dept: PHYSICAL THERAPY | Facility: CLINIC | Age: 28
Setting detail: THERAPIES SERIES
Discharge: HOME | End: 2023-06-20
Payer: OTHER GOVERNMENT

## 2023-06-20 PROCEDURE — 97110 THERAPEUTIC EXERCISES: CPT

## 2023-06-20 NOTE — FLOWSHEET NOTE
Sidelying       Open book with DB 10xea  New    Clamshells  10x2 ea Blue  Increased resistance          Quadruped       Cat/cow 10x ea  Cued for diaphragmatic breathing    Child's pose 1'     Bird dog 10x ea  Added              Prone         Quad stretch 1'   Also given in standing as alternative for HEP          Stability ball:      Diaphragmatic breathing x     Pelvic tiltis 10x ea  Fwd/retro, side to side   Pelvic circles 10x ea           Adductor rock backs  10x ea  Added     bolded completed 23        Treatment Charges: Mins Units   []  Modalities     [x]  Ther Exercise 47 3   []  Manual Therapy     []  Ther Activities     []  Aquatics     []  Vasocompression     [x]  Other- neuro     Total Treatment time 47 3       Assessment: [x] Progressing toward goals. Continued to conservatively progress strength. Alternating between strengthening exercises and stretching to ensure pelvic floor elongation. Pt tolerates progressions well. [] No change. [] Other:  [x] Patient would continue to benefit from skilled physical therapy services in order to: stretch & strengthen PFM, strengthen core, B hips, gluteals to improve AMEE / intraabdominal pressure regulation, postural awareness, body mechanics & reduced pain with intercourse to return to pre-pregnancy function. Problems:   [x] ? Pain: LBP, R SIJ  [x] ? ROM: PFM excursion, B Hip inflexibility (B HS, B piriformis, B hip flexors); B thoracic rotation   [x] ? Strength: core, PF, & B hip/gluteal weakness   [x] ?  Function: LISA: 41.7% impairment, 10 points; PPIQ: 5 points (\"quite a bit\" for intimacy; \"a little bit\" for mood)  [place]  Depression Scale: 11 points (Possible Depression: 10+, denies suicidal ideation)  [x] Other: UUI, STALIN, AMEE, impaired load transfer,  scar; Core instability & difficultly managing IAP noted with B SLS & B ASLR       STG: (to be met in 8 treatments) As of 23  Pt will be able to

## 2023-07-06 ENCOUNTER — HOSPITAL ENCOUNTER (OUTPATIENT)
Dept: PHYSICAL THERAPY | Facility: CLINIC | Age: 28
Setting detail: THERAPIES SERIES
Discharge: HOME | End: 2023-07-06
Payer: OTHER GOVERNMENT

## 2023-07-06 PROCEDURE — 97110 THERAPEUTIC EXERCISES: CPT

## 2023-07-20 ENCOUNTER — HOSPITAL ENCOUNTER (OUTPATIENT)
Dept: PHYSICAL THERAPY | Facility: CLINIC | Age: 28
Setting detail: THERAPIES SERIES
Discharge: HOME | End: 2023-07-20
Payer: OTHER GOVERNMENT

## 2023-07-20 PROCEDURE — 97110 THERAPEUTIC EXERCISES: CPT

## 2023-07-20 NOTE — FLOWSHEET NOTE
breathing for improved PF relaxation/ROM MET  Pt to have had purchased pelvic wand to adhere to tissue training protocol to improve tissue tolerance MET, had been independent prior to LEEP, but not using currently d/t being on pelvic rest  Pt will demo (-) B hip flexibility tests (Piriformis, 90/90 HS & Unk Ray) to ease ADLs MET   Pt will report ability to have sexual partner achieve initial penetration with no reproduction of sxs. MET, prior to LEEP  Pt will demo improved B thoracic rotation to WNLs to improve breath/rib cage expansion & ease with ADLs. MET      LTG: (to be met in 16 treatments)  Pt will report 0-1/10 average pain for improved QOL  Pt to achieve PF & core strength to 4/5 &/or endurance for >8 seconds for optimal PF function if applicable & PFM tension has resolved  Pt to be able to perform all advanced ADL's without leaking for improved urogenital function  Pt will reports ease with defecation by 80% & improved BM frequency to every day to improve bowel function   Pt will demo bilateral hip strength to 4+/5 or greater for improve hip stability during activity   Pt to demo improved IAP regulation and reduction of AMEE by 0.5 fingers width &/or 1/2 knuckle depth for improved core stability   Pt will report 2-3 point improvement on LISA & \"a little bit to not at all\" on PPIQ (for intimacy & mood) to indicate improved pelvic health functioning  Pt will report ability to have sexual partner achieve deeper penetration with movement and with no reproduction of sxs. Patient goals: Zane Stanton get started on strengthening my abs & PFM\" Progressing 6/8/23    Pt.  Education:  [x] Yes  [] No  [x] Reviewed Prior HEP/Ed  Method of Education: [x] Verbal- discontinue running if LBP persists or worsens, or if symptoms of pelvic pain, heaviness or leakage present [x] Demo [x] Written - see below for updates  Access Code: [email or web address]  Program Notes  estrogen cream -  prn squatty potty/ stooltowel lumbar roll for sittingkeep

## 2023-07-28 ENCOUNTER — PATIENT MESSAGE (OUTPATIENT)
Dept: OBGYN CLINIC | Age: 28
End: 2023-07-28

## 2023-07-28 RX ORDER — FLUCONAZOLE 150 MG/1
150 TABLET ORAL ONCE
Qty: 1 TABLET | Refills: 0 | Status: SHIPPED | OUTPATIENT
Start: 2023-07-28 | End: 2023-07-28

## 2023-08-03 ENCOUNTER — HOSPITAL ENCOUNTER (OUTPATIENT)
Dept: PHYSICAL THERAPY | Facility: CLINIC | Age: 28
Setting detail: THERAPIES SERIES
Discharge: HOME | End: 2023-08-03
Payer: OTHER GOVERNMENT

## 2023-08-03 PROCEDURE — 97530 THERAPEUTIC ACTIVITIES: CPT

## 2023-08-03 NOTE — DISCHARGE SUMMARY
and Counter Support  - 1 x daily - 7 x weekly - 1 sets - 1 reps - 60 hold  - Sit to Stand with Pelvic Floor Contraction  - 1 x daily - 7 x weekly - 10 reps  - Standing Anti-Rotation Press with Anchored Resistance  - 1 x daily - 7 x weekly - 2 sets - 10 reps  - Standing Heel Raise  - 1 x daily - 7 x weekly - 15 reps  - Standing Single Leg Heel Raise  - 1 x daily - 7 x weekly - 10 reps  - Single Leg Balance with Pelvic Floor Contraction  - 1 x daily - 7 x weekly - 3 reps - 10 seconds hold    Patient Education  - Office Posture  - Sleep Positions  - Household Activities  - Treating Persistent Pain Without Medications: Relaxation  - Heat  - Ice  - Bowel Emptying Techniques  - Abdominal Massage for Constipation  - Get To Know Your Pelvic Floor- Female  - Sway Back Posture  - Healthy Posture: How to Hold and Lift a Baby      Comprehension of Education:  [x] Verbalizes understanding. [x] Demonstrates understanding. [] Needs review   [x] Demonstrates/verbalizes HEP/Ed previously given.      Plan: [x] Discharged 8/3/23      Time In: 8:55am             Time Out: 9:30am    Treatment Charges: Mins Units   []  Modalities     [x]  Ther Exercise 7 0   []  Manual Therapy     [x]  Ther Activities 28 2   []  Aquatics     []  Vasocompression     []  Other- neuro     Total Treatment time 35 2       Electronically signed by:  Jenifer Santa PT

## 2023-10-03 NOTE — TELEPHONE ENCOUNTER
No she doesn't need to do anything special at this time besides staying away from that person if possible. If she can't stay away from them they need to make sure the area with shingles is covered so that she doesn't touch the area. If she does develop shingles she should let us know however and I can send in medications.   Thanks /82, HR 75, O2 99 RM AIR. no signs of bleeding noted, no complaints reported

## 2023-11-05 NOTE — PROGRESS NOTES
[address] [facility]  [facility] -  [facility] [address] [address]  Dept: [phone]  Dept Fax: [phone]    23    Chief Complaint   Patient presents with    Other     mychrtchckin n/a  [employer] anthem ins  p 3..23 neg HPV+  colpo 4..23 NORA II-III  leep 5..23 NORA I       Darrius Cos 32 y.o. is here for a 6 month follow up after her LEEP. She denies any complaints. Review of Systems   Constitutional:  Negative for chills and fever. HENT:  Negative for congestion. Respiratory:  Negative for cough and shortness of breath. Cardiovascular:  Negative for chest pain and palpitations. Gastrointestinal:  Negative for abdominal pain. Genitourinary:  Negative for pelvic pain and vaginal discharge. Musculoskeletal:  Negative for back pain. Neurological:  Negative for dizziness. Psychiatric/Behavioral:  The patient is not nervous/anxious. Gynecologic History  Patient's last menstrual period was 10/06/2023.   Contraception:  same sex marriage  Last Pap: 3/13/23  Results: normal  Last Mammogram: n/a    Obstetric History  : 1  Para: 1  AB: 0    Past Medical History:   Diagnosis Date    Abnormal Pap smear of cervix     Anxiety     Attention deficit disorder (ADD) without hyperactivity     child but never treated    COVID-19 2020    mild symptoms x 1 week    COVID-19 vaccine series completed     Depression     Encounter for assisted reproductive fertility cycle     IUI, partner female    Neurologic disorder     fingers on right hand, fingers go number     Past Surgical History:   Procedure Laterality Date     SECTION N/A 2022     SECTION performed by Kaleb Sharpe MD at [address] [facility] L[facility] OR    COLONOSCOPY      HYMENECTOMY  2014    LEEP N/A 2023    LEEP performed by Kaleb Sharpe MD at [address]    Allergen Reactions    Chlorhexidine Hives and Rash    Sulfa Antibiotics

## 2023-11-06 ENCOUNTER — OFFICE VISIT (OUTPATIENT)
Dept: OBGYN CLINIC | Age: 28
End: 2023-11-06
Payer: COMMERCIAL

## 2023-11-06 ENCOUNTER — HOSPITAL ENCOUNTER (OUTPATIENT)
Age: 28
Setting detail: SPECIMEN
Discharge: HOME | End: 2023-11-06

## 2023-11-06 VITALS
HEART RATE: 76 BPM | WEIGHT: 143 LBS | HEIGHT: 62 IN | SYSTOLIC BLOOD PRESSURE: 125 MMHG | DIASTOLIC BLOOD PRESSURE: 73 MMHG | BODY MASS INDEX: 26.31 KG/M2

## 2023-11-06 DIAGNOSIS — Z12.4 ENCOUNTER FOR REPEAT PAPANICOLAOU SMEAR OF CERVIX: ICD-10-CM

## 2023-11-06 DIAGNOSIS — Z98.890 POSTOPERATIVE STATE: Primary | ICD-10-CM

## 2023-11-06 DIAGNOSIS — Z86.19 HISTORY OF INFECTION DUE TO HUMAN PAPILLOMA VIRUS (HPV): ICD-10-CM

## 2023-11-06 PROCEDURE — 99212 OFFICE O/P EST SF 10 MIN: CPT | Performed by: OBSTETRICS & GYNECOLOGY

## 2023-11-06 RX ORDER — BUPROPION HYDROCHLORIDE 150 MG/1
150 TABLET ORAL EVERY MORNING
Qty: 30 TABLET | Refills: 11 | COMMUNITY
Start: 2023-09-05 | End: 2024-09-04

## 2023-11-06 ASSESSMENT — ENCOUNTER SYMPTOMS
ABDOMINAL PAIN: 0
SHORTNESS OF BREATH: 0
COUGH: 0
BACK PAIN: 0

## 2023-11-07 LAB
HPV I/H RISK 4 DNA CVX QL NAA+PROBE: NOT DETECTED
HPV SAMPLE: NORMAL
HPV, INTERPRETATION: NORMAL
HPV16 DNA CVX QL NAA+PROBE: NOT DETECTED
HPV18 DNA CVX QL NAA+PROBE: NOT DETECTED
SPECIMEN DESCRIPTION: NORMAL

## 2023-11-09 LAB — CYTOLOGY REPORT: NORMAL

## 2024-03-15 ENCOUNTER — PATIENT MESSAGE (OUTPATIENT)
Dept: OBGYN CLINIC | Age: 29
End: 2024-03-15

## 2024-03-15 RX ORDER — FLUCONAZOLE 150 MG/1
150 TABLET ORAL ONCE
Qty: 1 TABLET | Refills: 0 | Status: SHIPPED | OUTPATIENT
Start: 2024-03-15 | End: 2024-03-15

## 2024-03-15 NOTE — TELEPHONE ENCOUNTER
From: Concepción Lane  To: Dr. Kati Quiles  Sent: 3/15/2024 7:15 AM EDT  Subject: script     hello! I’ve had some vaginal itching the last few days and was wondering if i could get something to help! thank you!

## 2024-05-02 NOTE — PROGRESS NOTES
vulvar,vaginal or cervical lesions noted.  Normal vaginal discharge, uterus anterior, 4-6 weeks without CMT.  Adnexa nontender without abnormal masses bilaterally.  Rectal Exam: Omitted.  Extremities: Nontender without clubbing, cyanosis or edema. F.R.O.M.  Neurologic Exam: Grossly intact without noted sensorimotor deficits and oriented x 3.    Assessment/Plan:   Unremarkable annual Gyn exam.    Cervical Cytology Evaluation begins at 21 years old.  If Negative Cytology, Follow-up screening per current guidelines.    Mammograms every 1year. If 39 yo and last mammogram was negative.  Hereditary Breast, Ovarian, Colon and Uterine Cancer screening done.    Calcium and Vitamin D dosing reviewed.  Colonoscopy screening reviewed as well as onset for bone density testing.  Birth control and barrier recommendations discussed.  STD counseling and prevention reviewed.  Gardisil counseling completed for all patients 9-46 yo.  Routine health maintenance per patients PCP.  Pt to follow up for annual exam in 1 year    [facility] [place]

## 2024-05-06 ENCOUNTER — OFFICE VISIT (OUTPATIENT)
Dept: OBGYN CLINIC | Age: 29
End: 2024-05-06
Payer: COMMERCIAL

## 2024-05-06 ENCOUNTER — HOSPITAL ENCOUNTER (OUTPATIENT)
Age: 29
Setting detail: SPECIMEN
Discharge: HOME | End: 2024-05-06

## 2024-05-06 VITALS
HEART RATE: 84 BPM | DIASTOLIC BLOOD PRESSURE: 80 MMHG | BODY MASS INDEX: 27.05 KG/M2 | HEIGHT: 62 IN | SYSTOLIC BLOOD PRESSURE: 131 MMHG | WEIGHT: 147 LBS

## 2024-05-06 DIAGNOSIS — Z01.419 WELL WOMAN EXAM WITH ROUTINE GYNECOLOGICAL EXAM: Primary | ICD-10-CM

## 2024-05-06 PROCEDURE — 99395 PREV VISIT EST AGE 18-39: CPT | Performed by: OBSTETRICS & GYNECOLOGY

## 2024-05-06 ASSESSMENT — ENCOUNTER SYMPTOMS
BACK PAIN: 0
SHORTNESS OF BREATH: 0
ABDOMINAL PAIN: 0
COUGH: 0

## 2024-05-14 LAB — CYTOLOGY REPORT: NORMAL

## 2025-01-01 ENCOUNTER — TELEPHONE (OUTPATIENT)
Dept: OBGYN | Age: 30
End: 2025-01-01

## 2025-01-01 RX ORDER — NITROFURANTOIN 25; 75 MG/1; MG/1
100 CAPSULE ORAL 2 TIMES DAILY
Qty: 20 CAPSULE | Refills: 0 | Status: SHIPPED | OUTPATIENT
Start: 2025-01-01 | End: 2025-01-11

## 2025-01-03 ENCOUNTER — TELEPHONE (OUTPATIENT)
Dept: OBGYN | Age: 30
End: 2025-01-03

## 2025-01-03 RX ORDER — VALACYCLOVIR HYDROCHLORIDE 1 G/1
2000 TABLET, FILM COATED ORAL 2 TIMES DAILY
Qty: 40 TABLET | Refills: 0 | Status: SHIPPED | OUTPATIENT
Start: 2025-01-03 | End: 2025-01-13

## 2025-03-26 RX ORDER — FLUCONAZOLE 150 MG/1
150 TABLET ORAL ONCE
Qty: 1 TABLET | Refills: 0 | Status: SHIPPED | OUTPATIENT
Start: 2025-03-26 | End: 2025-03-26

## (undated) DEVICE — GLOVE SURG SZ 6 THK91MIL LTX FREE SYN POLYISOPRENE ANTI

## (undated) DEVICE — SYRINGE, LUER LOCK, 10ML: Brand: MEDLINE

## (undated) DEVICE — SVMMC GYN MIN PK

## (undated) DEVICE — STRAP,POSITIONING,KNEE/BODY,FOAM,4X60": Brand: MEDLINE

## (undated) DEVICE — PAD,SANITARY,11 IN,MAXI,W/WINGS,N-STRL: Brand: MEDLINE

## (undated) DEVICE — TOWEL SURG W16XL26IN WHT NONFENESTRATED ST 2 PER PK

## (undated) DEVICE — ELECTRODE ES L11CM DIA5MM BALL SHFT RED DISP UTAHLOOP

## (undated) DEVICE — Z DUP USE 2522782 SOLUTION IRRIG 1000ML STRL H2O PLAS CONTAINER UROMATIC

## (undated) DEVICE — KENDALL SCD EXPRESS SLEEVES, KNEE LENGTH, MEDIUM: Brand: KENDALL SCD

## (undated) DEVICE — SUTURE PERMAHAND SZ 3-0 L30IN NONABSORBABLE BLK SH L26MM K832H

## (undated) DEVICE — SUTURE VCRL 3-0 L36IN ABSRB VLT CT-1 L36MM 1/2 CIR J344H

## (undated) DEVICE — YANKAUER,FLEXIBLE HANDLE,REGLR CAPACITY: Brand: MEDLINE INDUSTRIES, INC.

## (undated) DEVICE — SOLUTION SOD CHL 0.9% 1000ML

## (undated) DEVICE — NEPTUNE E-SEP SMOKE EVACUATION PENCIL, COATED, 70MM BLADE, PUSH BUTTON SWITCH: Brand: NEPTUNE E-SEP

## (undated) DEVICE — ELECTRODE ARTHSCP 15X11MM SHFT 11CM MPLR LOOP GRN DISP W/

## (undated) DEVICE — STAPLER SKIN L39MM DIA0.53MM CRWN 5.7MM S STL FIX HD PROX

## (undated) DEVICE — TUBING, SUCTION, 9/32" X 20', STRAIGHT: Brand: MEDLINE INDUSTRIES, INC.

## (undated) DEVICE — COVER,LIGHT HANDLE,FLX,2/PK: Brand: MEDLINE INDUSTRIES, INC.

## (undated) DEVICE — ELECTRODE PT RET AD L9FT HI MOIST COND ADH HYDRGEL CORDED

## (undated) DEVICE — SUTURE MCRYL SZ 0 L36IN ABSRB VLT L48MM CTX 1/2 CIR Y398H

## (undated) DEVICE — NEEDLE HYPO 25GA L1.5IN BLU POLYPR HUB S STL REG BVL STR

## (undated) DEVICE — UNDERPANTS MAT L XL SEAMLESS CLR CODE WAISTBAND KNIT